# Patient Record
Sex: FEMALE | Race: WHITE | Employment: FULL TIME | URBAN - METROPOLITAN AREA
[De-identification: names, ages, dates, MRNs, and addresses within clinical notes are randomized per-mention and may not be internally consistent; named-entity substitution may affect disease eponyms.]

---

## 2018-10-08 ENCOUNTER — ROUTINE PRENATAL (OUTPATIENT)
Dept: PERINATAL CARE | Age: 28
End: 2018-10-08
Payer: COMMERCIAL

## 2018-10-08 VITALS
HEIGHT: 64 IN | HEART RATE: 98 BPM | SYSTOLIC BLOOD PRESSURE: 134 MMHG | BODY MASS INDEX: 50.02 KG/M2 | DIASTOLIC BLOOD PRESSURE: 79 MMHG | RESPIRATION RATE: 18 BRPM | WEIGHT: 293 LBS | TEMPERATURE: 98.6 F

## 2018-10-08 DIAGNOSIS — O99.212 OBESITY AFFECTING PREGNANCY IN SECOND TRIMESTER: Primary | ICD-10-CM

## 2018-10-08 DIAGNOSIS — Z36.86 ENCOUNTER FOR SCREENING FOR RISK OF PRE-TERM LABOR: ICD-10-CM

## 2018-10-08 DIAGNOSIS — Z3A.20 20 WEEKS GESTATION OF PREGNANCY: ICD-10-CM

## 2018-10-08 LAB
ABDOMINAL CIRCUMFERENCE: NORMAL CM
BIPARIETAL DIAMETER: NORMAL CM
ESTIMATED FETAL WEIGHT: NORMAL GRAMS
FEMORAL DIAMETER: NORMAL CM
HC/AC: NORMAL
HEAD CIRCUMFERENCE: NORMAL CM

## 2018-10-08 PROCEDURE — 76817 TRANSVAGINAL US OBSTETRIC: CPT | Performed by: OBSTETRICS & GYNECOLOGY

## 2018-10-08 PROCEDURE — 76811 OB US DETAILED SNGL FETUS: CPT | Performed by: OBSTETRICS & GYNECOLOGY

## 2018-10-08 RX ORDER — PROMETHAZINE HYDROCHLORIDE 25 MG/1
25 TABLET ORAL DAILY
COMMUNITY
End: 2022-04-19

## 2018-10-08 NOTE — PROGRESS NOTES
Pt did not give a urine specimen at the time of vitals. Please refer to attached ultrasound report for doctor's evaluation of the clinical information obtained by vital signs, ultrasound, and/or non-stress test along with management recommendation.

## 2018-12-03 ENCOUNTER — ROUTINE PRENATAL (OUTPATIENT)
Dept: PERINATAL CARE | Age: 28
End: 2018-12-03
Payer: COMMERCIAL

## 2018-12-03 VITALS
SYSTOLIC BLOOD PRESSURE: 112 MMHG | BODY MASS INDEX: 50.02 KG/M2 | TEMPERATURE: 98.2 F | HEART RATE: 98 BPM | DIASTOLIC BLOOD PRESSURE: 76 MMHG | RESPIRATION RATE: 16 BRPM | WEIGHT: 293 LBS | HEIGHT: 64 IN

## 2018-12-03 DIAGNOSIS — O99.213 OBESITY AFFECTING PREGNANCY IN THIRD TRIMESTER: Primary | ICD-10-CM

## 2018-12-03 DIAGNOSIS — Z3A.28 28 WEEKS GESTATION OF PREGNANCY: ICD-10-CM

## 2018-12-03 DIAGNOSIS — Z13.89 ENCOUNTER FOR ROUTINE SCREENING FOR MALFORMATION USING ULTRASONICS: ICD-10-CM

## 2018-12-03 PROCEDURE — 76805 OB US >/= 14 WKS SNGL FETUS: CPT | Performed by: OBSTETRICS & GYNECOLOGY

## 2018-12-03 PROCEDURE — 76819 FETAL BIOPHYS PROFIL W/O NST: CPT | Performed by: OBSTETRICS & GYNECOLOGY

## 2021-12-14 ENCOUNTER — HOSPITAL ENCOUNTER (EMERGENCY)
Age: 31
Discharge: HOME | End: 2021-12-14
Payer: COMMERCIAL

## 2021-12-14 VITALS
TEMPERATURE: 98 F | BODY MASS INDEX: 51.44 KG/M2 | HEART RATE: 88 BPM | WEIGHT: 293 LBS | OXYGEN SATURATION: 97 % | SYSTOLIC BLOOD PRESSURE: 146 MMHG | RESPIRATION RATE: 16 BRPM | DIASTOLIC BLOOD PRESSURE: 69 MMHG

## 2021-12-14 DIAGNOSIS — J06.9 VIRAL URI WITH COUGH: Primary | ICD-10-CM

## 2021-12-14 DIAGNOSIS — Z20.822 COVID-19 RULED OUT BY LABORATORY TESTING: ICD-10-CM

## 2021-12-14 LAB — SARS-COV-2, NAA: NOT  DETECTED

## 2021-12-14 PROCEDURE — 99202 OFFICE O/P NEW SF 15 MIN: CPT | Performed by: NURSE PRACTITIONER

## 2021-12-14 PROCEDURE — 99203 OFFICE O/P NEW LOW 30 MIN: CPT

## 2021-12-14 PROCEDURE — 87635 SARS-COV-2 COVID-19 AMP PRB: CPT

## 2021-12-14 RX ORDER — ACETAMINOPHEN 325 MG/1
650 TABLET ORAL EVERY 6 HOURS PRN
COMMUNITY

## 2021-12-14 ASSESSMENT — ENCOUNTER SYMPTOMS
WHEEZING: 0
TROUBLE SWALLOWING: 0
DIARRHEA: 0
SORE THROAT: 0
CHEST TIGHTNESS: 0
COUGH: 1
RHINORRHEA: 1
FACIAL SWELLING: 0
EYE REDNESS: 0
VOMITING: 0
NAUSEA: 0
EYE DISCHARGE: 0
SHORTNESS OF BREATH: 0

## 2021-12-14 NOTE — ED PROVIDER NOTES
40 [address]       Chief Complaint   Patient presents with    Cough    Headache    Otalgia    Concern For COVID-19       Nurses Notes reviewed and I agree except as noted in the HPI. HISTORY OF PRESENT ILLNESS   Marnie Baldwin is a 32 y.o. female who presents for evaluation of cough, headache, ear pain. Onset of symptoms this past Thursday/Friday, unchanged. Ear pain/headache, resolved. Cough is intermittent, dry. No fever, wheeze, shortness of breath. Patient currently in her first trimester. Denies chest pain or shortness of breath. No improvement with current treatment. REVIEW OF SYSTEMS     Review of Systems   Constitutional: Negative for chills, diaphoresis, fatigue and fever. HENT: Positive for congestion, ear pain and rhinorrhea. Negative for ear discharge, facial swelling, sore throat and trouble swallowing. Eyes: Negative for discharge and redness. Respiratory: Positive for cough. Negative for chest tightness, shortness of breath and wheezing. Cardiovascular: Negative for chest pain. Gastrointestinal: Negative for diarrhea, nausea and vomiting. Genitourinary: Negative for decreased urine volume. Musculoskeletal: Positive for myalgias. Negative for neck pain and neck stiffness. Skin: Negative for rash. Neurological: Positive for headaches. Hematological: Negative for adenopathy. Psychiatric/Behavioral: Negative for sleep disturbance. PAST MEDICAL HISTORY   History reviewed. No pertinent past medical history. SURGICAL HISTORY     Patient  has a past surgical history that includes Cholecystectomy; [place] tooth extraction; and Tonsillectomy.     CURRENT MEDICATIONS       Previous Medications    ACETAMINOPHEN (TYLENOL) 325 MG TABLET    Take 650 mg by mouth every 6 hours as needed for Pain    DOXYLAMINE SUCCINATE, SLEEP, (UNISOM PO)    Take by mouth nightly    PRENATAL VIT-FE FUMARATE-FA (PRENATAL PO) heard.  No friction rub. No gallop. Pulmonary:      Effort: Pulmonary effort is normal. No respiratory distress. Breath sounds: Normal breath sounds. Chest:   Breasts:      Right: No supraclavicular adenopathy. Left: No supraclavicular adenopathy. Musculoskeletal:      Cervical back: Normal range of motion and neck supple. Lymphadenopathy:      Head:      Right side of head: No submental, submandibular, tonsillar or occipital adenopathy. Left side of head: No submental, submandibular, tonsillar or occipital adenopathy. Cervical: No cervical adenopathy. Upper Body:      Right upper body: No supraclavicular adenopathy. Left upper body: No supraclavicular adenopathy. Skin:     General: Skin is warm and dry. Capillary Refill: Capillary refill takes less than 2 seconds. Coloration: Skin is not pale. Findings: No rash. Comments: Skin warm and dry to touch, no rashes noted on exposed surfaces. Neurological:      Mental Status: She is alert and oriented to person, place, and time. She is not disoriented. Psychiatric:         Mood and Affect: Mood normal.         Behavior: Behavior is cooperative. DIAGNOSTIC RESULTS   Labs:   Results for orders placed or performed during the hospital encounter of 12/14/21   COVID-19, Rapid   Result Value Ref Range    SARS-CoV-2, VANDA NOT  DETECTED NOT DETECTED       IMAGING:  No orders to display     URGENT CARE COURSE:     Vitals:    12/14/21 1358   BP: (!) 146/69   Pulse: 88   Resp: 16   Temp: 98 °F (36.7 °C)   TempSrc: Temporal   SpO2: 97%   Weight: 295 lb (133.8 kg)       Medications - No data to display  PROCEDURES:  None  FINALIMPRESSION      1. Viral URI with cough    2. COVID-19 ruled out by laboratory testing        DISPOSITION/PLAN   DISPOSITION Decision To Discharge 12/14/2021 02:26:53 PM  Nontoxic, no distress. Cough secondary to postnasal drainage. COVID-19 negative.   Exam consistent with viral URI

## 2021-12-14 NOTE — Clinical Note
Siobhan Guadalupe was seen and treated in our emergency department on 12/14/2021. She may return to work on 12/16/2021. If you have any questions or concerns, please don't hesitate to call.       Audie Arreguin, EDIS - CNP

## 2021-12-14 NOTE — ED NOTES
Nasal swab for covid obtained. Labeled, taken to lab, tolerated well. Nurse wearing PPE.      Zainab Costello, WHITLEY  70/13/44 [id number]

## 2021-12-14 NOTE — ED TRIAGE NOTES
Patient states has concerns for covid, exposed at work last wk. Symptoms started Friday with sinus pressure, dry cough, minor earache, headaches, tylenol taken. Patient states she is 10 wk. Pregnant, due date 7-9-2022.

## 2021-12-14 NOTE — Clinical Note
Taty Michel was seen and treated in our emergency department on 12/14/2021. She may return to work on 12/15/2021. If you have any questions or concerns, please don't hesitate to call.       Javier Carlson, APRN - CNP

## 2022-04-19 ENCOUNTER — HOSPITAL ENCOUNTER (OUTPATIENT)
Dept: NURSING | Age: 32
Discharge: HOME | End: 2022-04-19
Payer: COMMERCIAL

## 2022-04-19 VITALS
SYSTOLIC BLOOD PRESSURE: 120 MMHG | OXYGEN SATURATION: 98 % | DIASTOLIC BLOOD PRESSURE: 55 MMHG | HEART RATE: 86 BPM | RESPIRATION RATE: 16 BRPM

## 2022-04-19 LAB
ANTIBODY SCREEN: NORMAL
GESTATIONAL AGE(WEEKS): NORMAL

## 2022-04-19 PROCEDURE — 6360000002 HC RX W HCPCS: Performed by: OBSTETRICS & GYNECOLOGY

## 2022-04-19 PROCEDURE — 86850 RBC ANTIBODY SCREEN: CPT

## 2022-04-19 PROCEDURE — 96372 THER/PROPH/DIAG INJ SC/IM: CPT

## 2022-04-19 RX ORDER — FERROUS SULFATE 325(65) MG
325 TABLET ORAL
COMMUNITY

## 2022-04-19 RX ADMIN — HUMAN RHO(D) IMMUNE GLOBULIN 300 MCG: 300 INJECTION, SOLUTION INTRAMUSCULAR at 16:26

## 2022-04-19 NOTE — PROGRESS NOTES
1507: Patient arrived ambulatory for rhogam injection. Patient rights and responsibilities offered to patient. 1520:Blood work collected per order. Patient feeling dizzy and queasy during draw, provided snack and beverage. 1545:Patient states she is feeling much better now. Awaiting rhogam injection. 1630: Injection administered. AVS reviewed with patient, voiced understanding. Patient discharged ambulatory.                              _m___ Safety:       (Environmental)   [place] to environment   Ensure ID band is correct and in place/ allergy band as needed   Assess for fall risk   Initiate fall precautions as applicable (fall band, side rails, etc.)   Call light within reach   Bed in low position/ wheels locked    _m___ Pain:        Assess pain level and characteristics   Administer analgesics as ordered   Assess effectiveness of pain management and report to MD as needed    _m___ Knowledge Deficit:   Assess baseline knowledge   Provide teaching at level of understanding   Provide teaching via preferred learning method   Evaluate teaching effectiveness    _m___ Hemodynamic/Respiratory Status:       (Pre and Post Procedure Monitoring)   Assess/Monitor vital signs and LOC   Assess Baseline SpO2 prior to any sedation   Obtain weight/height   Assess vital signs/ LOC until patient meets discharge criteria   Monitor procedure site and notify MD of any issues

## 2022-06-28 ENCOUNTER — HOSPITAL ENCOUNTER (INPATIENT)
Age: 32
LOS: 1 days | Discharge: HOME | End: 2022-06-29
Attending: OBSTETRICS & GYNECOLOGY | Admitting: OBSTETRICS & GYNECOLOGY
Payer: COMMERCIAL

## 2022-06-28 ENCOUNTER — ANESTHESIA (OUTPATIENT)
Dept: LABOR AND DELIVERY | Age: 32
End: 2022-06-28
Payer: COMMERCIAL

## 2022-06-28 ENCOUNTER — ANESTHESIA EVENT (OUTPATIENT)
Dept: LABOR AND DELIVERY | Age: 32
End: 2022-06-28
Payer: COMMERCIAL

## 2022-06-28 PROBLEM — O42.90 ROM (RUPTURE OF MEMBRANES), PREMATURE: Status: ACTIVE | Noted: 2022-06-28

## 2022-06-28 PROBLEM — O47.9 FALSE LABOR: Status: ACTIVE | Noted: 2022-06-28

## 2022-06-28 LAB
ABO: NORMAL
AMNISURE PATIENT RESULT: NORMAL
AMPHETAMINE+METHAMPHETAMINE URINE SCREEN: NEGATIVE
BARBITURATE QUANTITATIVE URINE: NEGATIVE
BASOPHILS # BLD: 0.2 %
BASOPHILS ABSOLUTE: 0 THOU/MM3 (ref 0–0.1)
BENZODIAZEPINE QUANTITATIVE URINE: NEGATIVE
CANNABINOID QUANTITATIVE URINE: NEGATIVE
COCAINE METABOLITE QUANTITATIVE URINE: NEGATIVE
EOSINOPHIL # BLD: 0.5 %
EOSINOPHILS ABSOLUTE: 0.1 THOU/MM3 (ref 0–0.4)
ERYTHROCYTE [DISTWIDTH] IN BLOOD BY AUTOMATED COUNT: 16.3 % (ref 11.5–14.5)
ERYTHROCYTE [DISTWIDTH] IN BLOOD BY AUTOMATED COUNT: 46.2 FL (ref 35–45)
HCT VFR BLD CALC: 36.2 % (ref 37–47)
HEMOGLOBIN: 11.3 GM/DL (ref 12–16)
IMMATURE GRANS (ABS): 0.03 THOU/MM3 (ref 0–0.07)
IMMATURE GRANULOCYTES: 0.3 %
INDIRECT COOMBS: NORMAL
LYMPHOCYTES # BLD: 25.3 %
LYMPHOCYTES ABSOLUTE: 2.9 THOU/MM3 (ref 1–4.8)
MCH RBC QN AUTO: 24.7 PG (ref 26–33)
MCHC RBC AUTO-ENTMCNC: 31.2 GM/DL (ref 32.2–35.5)
MCV RBC AUTO: 79.2 FL (ref 81–99)
MONOCYTES # BLD: 7.4 %
MONOCYTES ABSOLUTE: 0.8 THOU/MM3 (ref 0.4–1.3)
NUCLEATED RED BLOOD CELLS: 0 /100 WBC
OPIATES, URINE: NEGATIVE
OXYCODONE: NEGATIVE
PHENCYCLIDINE QUANTITATIVE URINE: NEGATIVE
PLATELET # BLD: 264 THOU/MM3 (ref 130–400)
PMV BLD AUTO: 10.4 FL (ref 9.4–12.4)
RBC # BLD: 4.57 MILL/MM3 (ref 4.2–5.4)
RH FACTOR: NORMAL
RPR: NONREACTIVE
SEG NEUTROPHILS: 66.3 %
SEGMENTED NEUTROPHILS ABSOLUTE COUNT: 7.6 THOU/MM3 (ref 1.8–7.7)
WBC # BLD: 11.4 THOU/MM3 (ref 4.8–10.8)

## 2022-06-28 PROCEDURE — 86592 SYPHILIS TEST NON-TREP QUAL: CPT

## 2022-06-28 PROCEDURE — 2580000003 HC RX 258: Performed by: OBSTETRICS & GYNECOLOGY

## 2022-06-28 PROCEDURE — 0KQM0ZZ REPAIR PERINEUM MUSCLE, OPEN APPROACH: ICD-10-PCS | Performed by: OBSTETRICS & GYNECOLOGY

## 2022-06-28 PROCEDURE — 7200000001 HC VAGINAL DELIVERY

## 2022-06-28 PROCEDURE — 6370000000 HC RX 637 (ALT 250 FOR IP): Performed by: OBSTETRICS & GYNECOLOGY

## 2022-06-28 PROCEDURE — 6360000002 HC RX W HCPCS: Performed by: OBSTETRICS & GYNECOLOGY

## 2022-06-28 PROCEDURE — 3700000025 EPIDURAL BLOCK: Performed by: ANESTHESIOLOGY

## 2022-06-28 PROCEDURE — 86900 BLOOD TYPING SEROLOGIC ABO: CPT

## 2022-06-28 PROCEDURE — 84112 EVAL AMNIOTIC FLUID PROTEIN: CPT

## 2022-06-28 PROCEDURE — 90472 IMMUNIZATION ADMIN EACH ADD: CPT

## 2022-06-28 PROCEDURE — 2500000003 HC RX 250 WO HCPCS

## 2022-06-28 PROCEDURE — 90471 IMMUNIZATION ADMIN: CPT | Performed by: OBSTETRICS & GYNECOLOGY

## 2022-06-28 PROCEDURE — 90715 TDAP VACCINE 7 YRS/> IM: CPT | Performed by: OBSTETRICS & GYNECOLOGY

## 2022-06-28 PROCEDURE — 80307 DRUG TEST PRSMV CHEM ANLYZR: CPT

## 2022-06-28 PROCEDURE — 2500000003 HC RX 250 WO HCPCS: Performed by: NURSE ANESTHETIST, CERTIFIED REGISTERED

## 2022-06-28 PROCEDURE — 90707 MMR VACCINE SC: CPT

## 2022-06-28 PROCEDURE — 85025 COMPLETE CBC W/AUTO DIFF WBC: CPT

## 2022-06-28 PROCEDURE — 6360000002 HC RX W HCPCS

## 2022-06-28 PROCEDURE — 86901 BLOOD TYPING SEROLOGIC RH(D): CPT

## 2022-06-28 PROCEDURE — 86885 COOMBS TEST INDIRECT QUAL: CPT

## 2022-06-28 PROCEDURE — 51701 INSERT BLADDER CATHETER: CPT

## 2022-06-28 PROCEDURE — 1220000000 HC SEMI PRIVATE OB R&B

## 2022-06-28 RX ORDER — SODIUM CHLORIDE, SODIUM LACTATE, POTASSIUM CHLORIDE, CALCIUM CHLORIDE 600; 310; 30; 20 MG/100ML; MG/100ML; MG/100ML; MG/100ML
INJECTION, SOLUTION INTRAVENOUS CONTINUOUS
Status: DISCONTINUED | OUTPATIENT
Start: 2022-06-28 | End: 2022-06-29 | Stop reason: HOSPADM

## 2022-06-28 RX ORDER — SODIUM CHLORIDE 0.9 % (FLUSH) 0.9 %
5-40 SYRINGE (ML) INJECTION PRN
Status: DISCONTINUED | OUTPATIENT
Start: 2022-06-28 | End: 2022-06-28 | Stop reason: HOSPADM

## 2022-06-28 RX ORDER — MISOPROSTOL 200 UG/1
1000 TABLET ORAL PRN
Status: DISCONTINUED | OUTPATIENT
Start: 2022-06-28 | End: 2022-06-28 | Stop reason: HOSPADM

## 2022-06-28 RX ORDER — MISOPROSTOL 200 UG/1
800 TABLET ORAL PRN
Status: DISCONTINUED | OUTPATIENT
Start: 2022-06-28 | End: 2022-06-29 | Stop reason: HOSPADM

## 2022-06-28 RX ORDER — DIPHENHYDRAMINE HYDROCHLORIDE 50 MG/ML
25 INJECTION INTRAMUSCULAR; INTRAVENOUS EVERY 4 HOURS PRN
Status: DISCONTINUED | OUTPATIENT
Start: 2022-06-28 | End: 2022-06-28 | Stop reason: HOSPADM

## 2022-06-28 RX ORDER — ONDANSETRON 4 MG/1
8 TABLET, ORALLY DISINTEGRATING ORAL EVERY 8 HOURS PRN
Status: DISCONTINUED | OUTPATIENT
Start: 2022-06-28 | End: 2022-06-29 | Stop reason: HOSPADM

## 2022-06-28 RX ORDER — DOCUSATE SODIUM 100 MG/1
100 CAPSULE, LIQUID FILLED ORAL 2 TIMES DAILY
Status: DISCONTINUED | OUTPATIENT
Start: 2022-06-28 | End: 2022-06-29 | Stop reason: HOSPADM

## 2022-06-28 RX ORDER — MORPHINE SULFATE 4 MG/ML
4 INJECTION, SOLUTION INTRAMUSCULAR; INTRAVENOUS
Status: DISCONTINUED | OUTPATIENT
Start: 2022-06-28 | End: 2022-06-28 | Stop reason: HOSPADM

## 2022-06-28 RX ORDER — MORPHINE SULFATE 2 MG/ML
2 INJECTION, SOLUTION INTRAMUSCULAR; INTRAVENOUS
Status: DISCONTINUED | OUTPATIENT
Start: 2022-06-28 | End: 2022-06-28 | Stop reason: HOSPADM

## 2022-06-28 RX ORDER — LIDOCAINE HYDROCHLORIDE 10 MG/ML
30 INJECTION, SOLUTION EPIDURAL; INFILTRATION; INTRACAUDAL; PERINEURAL PRN
Status: DISCONTINUED | OUTPATIENT
Start: 2022-06-28 | End: 2022-06-28 | Stop reason: HOSPADM

## 2022-06-28 RX ORDER — SODIUM CHLORIDE 0.9 % (FLUSH) 0.9 %
5-40 SYRINGE (ML) INJECTION PRN
Status: DISCONTINUED | OUTPATIENT
Start: 2022-06-28 | End: 2022-06-28

## 2022-06-28 RX ORDER — IBUPROFEN 800 MG/1
800 TABLET ORAL EVERY 8 HOURS PRN
Status: DISCONTINUED | OUTPATIENT
Start: 2022-06-28 | End: 2022-06-28 | Stop reason: HOSPADM

## 2022-06-28 RX ORDER — ACETAMINOPHEN 500 MG
1000 TABLET ORAL EVERY 8 HOURS PRN
Status: DISCONTINUED | OUTPATIENT
Start: 2022-06-28 | End: 2022-06-29 | Stop reason: HOSPADM

## 2022-06-28 RX ORDER — NALBUPHINE HCL 10 MG/ML
5 AMPUL (ML) INJECTION EVERY 4 HOURS PRN
Status: DISCONTINUED | OUTPATIENT
Start: 2022-06-28 | End: 2022-06-28 | Stop reason: HOSPADM

## 2022-06-28 RX ORDER — SODIUM CHLORIDE, SODIUM LACTATE, POTASSIUM CHLORIDE, CALCIUM CHLORIDE 600; 310; 30; 20 MG/100ML; MG/100ML; MG/100ML; MG/100ML
INJECTION, SOLUTION INTRAVENOUS CONTINUOUS
Status: DISCONTINUED | OUTPATIENT
Start: 2022-06-28 | End: 2022-06-28

## 2022-06-28 RX ORDER — NALOXONE HYDROCHLORIDE 0.4 MG/ML
INJECTION, SOLUTION INTRAMUSCULAR; INTRAVENOUS; SUBCUTANEOUS PRN
Status: DISCONTINUED | OUTPATIENT
Start: 2022-06-28 | End: 2022-06-28 | Stop reason: HOSPADM

## 2022-06-28 RX ORDER — EPHEDRINE SULFATE/0.9% NACL/PF 50 MG/5 ML
10 SYRINGE (ML) INTRAVENOUS ONCE
Status: DISCONTINUED | OUTPATIENT
Start: 2022-06-28 | End: 2022-06-28

## 2022-06-28 RX ORDER — TERBUTALINE SULFATE 1 MG/ML
0.25 INJECTION, SOLUTION SUBCUTANEOUS
Status: DISCONTINUED | OUTPATIENT
Start: 2022-06-28 | End: 2022-06-28 | Stop reason: HOSPADM

## 2022-06-28 RX ORDER — METHYLERGONOVINE MALEATE 0.2 MG/ML
200 INJECTION INTRAVENOUS PRN
Status: DISCONTINUED | OUTPATIENT
Start: 2022-06-28 | End: 2022-06-28 | Stop reason: HOSPADM

## 2022-06-28 RX ORDER — SODIUM CHLORIDE 0.9 % (FLUSH) 0.9 %
5-40 SYRINGE (ML) INJECTION EVERY 12 HOURS SCHEDULED
Status: DISCONTINUED | OUTPATIENT
Start: 2022-06-28 | End: 2022-06-28

## 2022-06-28 RX ORDER — MODIFIED LANOLIN
OINTMENT (GRAM) TOPICAL PRN
Status: DISCONTINUED | OUTPATIENT
Start: 2022-06-28 | End: 2022-06-29 | Stop reason: HOSPADM

## 2022-06-28 RX ORDER — SODIUM CHLORIDE, SODIUM LACTATE, POTASSIUM CHLORIDE, AND CALCIUM CHLORIDE .6; .31; .03; .02 G/100ML; G/100ML; G/100ML; G/100ML
500 INJECTION, SOLUTION INTRAVENOUS PRN
Status: DISCONTINUED | OUTPATIENT
Start: 2022-06-28 | End: 2022-06-28 | Stop reason: HOSPADM

## 2022-06-28 RX ORDER — SODIUM CHLORIDE 9 MG/ML
25 INJECTION, SOLUTION INTRAVENOUS PRN
Status: DISCONTINUED | OUTPATIENT
Start: 2022-06-28 | End: 2022-06-28 | Stop reason: HOSPADM

## 2022-06-28 RX ORDER — EPHEDRINE SULFATE/0.9% NACL/PF 50 MG/5 ML
SYRINGE (ML) INTRAVENOUS
Status: COMPLETED
Start: 2022-06-28 | End: 2022-06-28

## 2022-06-28 RX ORDER — SODIUM CHLORIDE, SODIUM LACTATE, POTASSIUM CHLORIDE, AND CALCIUM CHLORIDE .6; .31; .03; .02 G/100ML; G/100ML; G/100ML; G/100ML
1000 INJECTION, SOLUTION INTRAVENOUS ONCE
Status: COMPLETED | OUTPATIENT
Start: 2022-06-28 | End: 2022-06-28

## 2022-06-28 RX ORDER — SODIUM CHLORIDE 0.9 % (FLUSH) 0.9 %
5-40 SYRINGE (ML) INJECTION EVERY 12 HOURS SCHEDULED
Status: DISCONTINUED | OUTPATIENT
Start: 2022-06-28 | End: 2022-06-28 | Stop reason: HOSPADM

## 2022-06-28 RX ORDER — METHYLERGONOVINE MALEATE 0.2 MG/ML
200 INJECTION INTRAVENOUS PRN
Status: DISCONTINUED | OUTPATIENT
Start: 2022-06-28 | End: 2022-06-29 | Stop reason: HOSPADM

## 2022-06-28 RX ORDER — IBUPROFEN 800 MG/1
800 TABLET ORAL EVERY 8 HOURS
Status: DISCONTINUED | OUTPATIENT
Start: 2022-06-28 | End: 2022-06-29 | Stop reason: HOSPADM

## 2022-06-28 RX ORDER — ONDANSETRON 2 MG/ML
8 INJECTION INTRAMUSCULAR; INTRAVENOUS EVERY 6 HOURS PRN
Status: DISCONTINUED | OUTPATIENT
Start: 2022-06-28 | End: 2022-06-28 | Stop reason: HOSPADM

## 2022-06-28 RX ORDER — ONDANSETRON 2 MG/ML
4 INJECTION INTRAMUSCULAR; INTRAVENOUS EVERY 6 HOURS PRN
Status: DISCONTINUED | OUTPATIENT
Start: 2022-06-28 | End: 2022-06-28 | Stop reason: HOSPADM

## 2022-06-28 RX ORDER — PENICILLIN G POTASSIUM 5000000 [IU]/1
INJECTION, POWDER, FOR SOLUTION INTRAMUSCULAR; INTRAVENOUS
Status: DISCONTINUED
Start: 2022-06-28 | End: 2022-06-28

## 2022-06-28 RX ORDER — PENICILLIN G 3000000 [IU]/50ML
3 INJECTION, SOLUTION INTRAVENOUS EVERY 4 HOURS
Status: DISCONTINUED | OUTPATIENT
Start: 2022-06-28 | End: 2022-06-28 | Stop reason: HOSPADM

## 2022-06-28 RX ORDER — SODIUM CHLORIDE 9 MG/ML
INJECTION, SOLUTION INTRAVENOUS PRN
Status: DISCONTINUED | OUTPATIENT
Start: 2022-06-28 | End: 2022-06-28

## 2022-06-28 RX ORDER — BUTORPHANOL TARTRATE 1 MG/ML
1 INJECTION, SOLUTION INTRAMUSCULAR; INTRAVENOUS
Status: DISCONTINUED | OUTPATIENT
Start: 2022-06-28 | End: 2022-06-28 | Stop reason: HOSPADM

## 2022-06-28 RX ORDER — SODIUM CHLORIDE, SODIUM LACTATE, POTASSIUM CHLORIDE, AND CALCIUM CHLORIDE .6; .31; .03; .02 G/100ML; G/100ML; G/100ML; G/100ML
1000 INJECTION, SOLUTION INTRAVENOUS PRN
Status: DISCONTINUED | OUTPATIENT
Start: 2022-06-28 | End: 2022-06-28 | Stop reason: HOSPADM

## 2022-06-28 RX ORDER — CARBOPROST TROMETHAMINE 250 UG/ML
250 INJECTION, SOLUTION INTRAMUSCULAR PRN
Status: DISCONTINUED | OUTPATIENT
Start: 2022-06-28 | End: 2022-06-28 | Stop reason: HOSPADM

## 2022-06-28 RX ORDER — ACETAMINOPHEN 325 MG/1
650 TABLET ORAL EVERY 4 HOURS PRN
Status: DISCONTINUED | OUTPATIENT
Start: 2022-06-28 | End: 2022-06-28 | Stop reason: HOSPADM

## 2022-06-28 RX ORDER — SEVOFLURANE 250 ML/250ML
1 LIQUID RESPIRATORY (INHALATION) CONTINUOUS PRN
Status: DISCONTINUED | OUTPATIENT
Start: 2022-06-28 | End: 2022-06-28 | Stop reason: HOSPADM

## 2022-06-28 RX ADMIN — SODIUM CHLORIDE, POTASSIUM CHLORIDE, SODIUM LACTATE AND CALCIUM CHLORIDE: 600; 310; 30; 20 INJECTION, SOLUTION INTRAVENOUS at 05:20

## 2022-06-28 RX ADMIN — SODIUM CHLORIDE, POTASSIUM CHLORIDE, SODIUM LACTATE AND CALCIUM CHLORIDE 1000 ML: 600; 310; 30; 20 INJECTION, SOLUTION INTRAVENOUS at 03:03

## 2022-06-28 RX ADMIN — MEASLES, MUMPS, AND RUBELLA VIRUS VACCINE LIVE 0.5 ML: 1000; 12500; 1000 INJECTION, POWDER, LYOPHILIZED, FOR SUSPENSION SUBCUTANEOUS at 11:39

## 2022-06-28 RX ADMIN — Medication 16 ML/HR: at 06:57

## 2022-06-28 RX ADMIN — PENICILLIN G 3 MILLION UNITS: 3000000 INJECTION, SOLUTION INTRAVENOUS at 07:42

## 2022-06-28 RX ADMIN — Medication 166.7 ML: at 08:06

## 2022-06-28 RX ADMIN — ACETAMINOPHEN 1000 MG: 500 TABLET ORAL at 23:44

## 2022-06-28 RX ADMIN — IBUPROFEN 800 MG: 800 TABLET, FILM COATED ORAL at 08:46

## 2022-06-28 RX ADMIN — TETANUS TOXOID, REDUCED DIPHTHERIA TOXOID AND ACELLULAR PERTUSSIS VACCINE, ADSORBED 0.5 ML: 5; 2.5; 8; 8; 2.5 SUSPENSION INTRAMUSCULAR at 11:28

## 2022-06-28 RX ADMIN — DOCUSATE SODIUM 100 MG: 100 CAPSULE, LIQUID FILLED ORAL at 19:48

## 2022-06-28 RX ADMIN — Medication 50 MG: at 07:05

## 2022-06-28 RX ADMIN — IBUPROFEN 800 MG: 800 TABLET, FILM COATED ORAL at 18:00

## 2022-06-28 RX ADMIN — Medication 87.3 MILLI-UNITS/MIN: at 08:17

## 2022-06-28 RX ADMIN — BUTORPHANOL TARTRATE 1 MG: 1 INJECTION, SOLUTION INTRAMUSCULAR; INTRAVENOUS at 05:16

## 2022-06-28 RX ADMIN — DEXTROSE MONOHYDRATE 5 MILLION UNITS: 5 INJECTION INTRAVENOUS at 03:57

## 2022-06-28 ASSESSMENT — PAIN DESCRIPTION - DESCRIPTORS: DESCRIPTORS: CRAMPING

## 2022-06-28 ASSESSMENT — PAIN DESCRIPTION - LOCATION: LOCATION: ABDOMEN

## 2022-06-28 ASSESSMENT — PAIN SCALES - GENERAL
PAINLEVEL_OUTOF10: 4
PAINLEVEL_OUTOF10: 7
PAINLEVEL_OUTOF10: 4
PAINLEVEL_OUTOF10: 6

## 2022-06-28 ASSESSMENT — PAIN DESCRIPTION - ORIENTATION: ORIENTATION: LOWER;MID

## 2022-06-28 NOTE — PLAN OF CARE
Problem: Vaginal Birth or  Section  Goal: Fetal and maternal status remain reassuring during the birth process  Description:  Birth OB-Pregnancy care plan goal which identifies if the fetal and maternal status remain reassuring during the birth process  2022 08 by Linda Davalos RN  Outcome: Progressing  2022 by Marium Lawton RN  Flowsheets (Taken 2022)  Fetal and Maternal Status Remain Reassuring During the Birth Process:   Monitor vital signs   Monitor uterine activity   Monitor fetal heart rate   Monitor labor progression (Vaginal delivery)     Problem: Pain  Goal: Verbalizes/displays adequate comfort level or baseline comfort level  2022 by Linda Davalos RN  Outcome: Progressing  2022 by Marium Lawton RN  Flowsheets (Taken 2022)  Verbalizes/displays adequate comfort level or baseline comfort level:   Encourage patient to monitor pain and request assistance   Assess pain using appropriate pain scale   Administer analgesics based on type and severity of pain and evaluate response   Implement non-pharmacological measures as appropriate and evaluate response     Problem: Infection - Adult  Goal: Absence of fever/infection during anticipated neutropenic period  2022 by Linda Davalos RN  Outcome: Progressing  2022 by Marium Lawton RN  Note: Patient afebrile at this time and BOW is intact       Problem: Safety - Adult  Goal: Free from fall injury  2022 by Linda Davalos RN  Outcome: Progressing  2022 by Marium Lwaton RN  Flowsheets (Taken 2022)  Free From Fall Injury:   Instruct family/caregiver on patient safety   Based on caregiver fall risk screen, instruct family/caregiver to ask for assistance with transferring infant if caregiver noted to have fall risk factors     Problem: Discharge Planning  Goal: Discharge to home or other facility with appropriate resources  6/28/2022 0856 by Soila Hogan RN  Outcome: Progressing  6/28/2022 0227 by Juanis Jain RN  Flowsheets (Taken 6/28/2022 0106)  Discharge to home or other facility with appropriate resources:   Identify barriers to discharge with patient and caregiver   Identify discharge learning needs (meds, wound care, etc)   Care plan reviewed with patient and her , Cyndi Foster. Patient and Cyndihandy Mcneilson verbalize understanding of the plan of care and contribute to goal setting.

## 2022-06-28 NOTE — FLOWSHEET NOTE
Pt up to bathroom and voided a large amount. Kath care and teaching given, scant amount of lochia rubra noted. Back to bed, gait steady. Fundus is even with umbilicus, firm and center. Ice and tucks applied.

## 2022-06-28 NOTE — FLOWSHEET NOTE
Spoke with Dr. Pacheco Worley on the phone and updated on SROM with clear fluid at 0333. Labor orders recvied.

## 2022-06-28 NOTE — FLOWSHEET NOTE
Pt arrived to room 5a 13 per wc with infant in arms, oriented to room and plan of care discussed. Pt denies pain or needs, voiced to call for voids x2. Call light is within reach.

## 2022-06-28 NOTE — FLOWSHEET NOTE
Spoke with Dr. Rachel Sheridan on the phone and updated on patient here for contractions that started around 0000, she is a , 38w3d, GBS positive, denies leaking of fluids, was in the office yesterday and was 3cm, 50%, VE is the same at this time, contractions are 4-8 minutes apart, FHTs are reassuring at this time. Orders recvied.

## 2022-06-28 NOTE — FLOWSHEET NOTE
Pt up to bathroom and voided a large amount. Kath care per pt scant amount of lochia rubra noted. Back to bed, gait steady.

## 2022-06-28 NOTE — PLAN OF CARE
Problem: Postpartum  Goal: Experiences normal postpartum course  Description:  Postpartum OB-Pregnancy care plan goal which identifies if the mother is experiencing a normal postpartum course  Outcome: Progressing  Flowsheets (Taken 2022 1046)  Experiences Normal Postpartum Course:   Monitor maternal vital signs   Assess uterine involution  Note: Vs wnl     Problem: Postpartum  Goal: Appropriate maternal -  bonding  Description:  Postpartum OB-Pregnancy care plan goal which identifies if the mother and  are bonding appropriately  Outcome: Progressing  Note: Mom and infant bonding well     Problem: Postpartum  Goal: Establishment of infant feeding pattern  Description:  Postpartum OB-Pregnancy care plan goal which identifies if the mother is establishing a feeding pattern with their   Outcome: Progressing  Flowsheets (Taken 2022 1046)  Establishment of Infant Feeding Pattern:   Assess breast/bottle feeding   Refer to lactation as needed  Note: Mom latching infant well     Problem: Postpartum  Goal: Incisions, wounds, or drain sites healing without S/S of infection  Outcome: Progressing  Flowsheets  Taken 2022 1046  Incisions, Wounds, or Drain Sites Healing Without Sign and Symptoms of Infection: ADMISSION and DAILY: Assess and document risk factors for pressure ulcer development  Taken 2022 1020  Incisions, Wounds, or Drain Sites Healing Without Sign and Symptoms of Infection:   ADMISSION and DAILY: Assess and document risk factors for pressure ulcer development   TWICE DAILY: Assess and document skin integrity  Note: No foul smelling drainage noted     Problem: Pain  Goal: Verbalizes/displays adequate comfort level or baseline comfort level  Outcome: Progressing  Flowsheets  Taken 2022 1046 by Rose Mary Meza RN  Verbalizes/displays adequate comfort level or baseline comfort level:   Encourage patient to monitor pain and request assistance   Assess pain using appropriate pain scale  Taken 6/28/2022 1020 by Raquel Caballero RN  Verbalizes/displays adequate comfort level or baseline comfort level:   Encourage patient to monitor pain and request assistance   Assess pain using appropriate pain scale  Taken 6/28/2022 0227 by Samia Cee RN  Verbalizes/displays adequate comfort level or baseline comfort level:   Encourage patient to monitor pain and request assistance   Assess pain using appropriate pain scale   Administer analgesics based on type and severity of pain and evaluate response   Implement non-pharmacological measures as appropriate and evaluate response  Note: Goal is 4, pt taking motrin and using tucks to antonio     Problem: Infection - Adult  Goal: Absence of infection at discharge  Outcome: Progressing  Flowsheets (Taken 6/28/2022 1046)  Absence of infection at discharge:   Assess and monitor for signs and symptoms of infection   Monitor lab/diagnostic results  Note: No foul smelling drainage noted     Problem: Infection - Adult  Goal: Absence of infection during hospitalization  Outcome: Progressing  Flowsheets (Taken 6/28/2022 1046)  Absence of infection during hospitalization:   Assess and monitor for signs and symptoms of infection   Monitor lab/diagnostic results     Problem: Safety - Adult  Goal: Free from fall injury  Outcome: Progressing  Flowsheets  Taken 6/28/2022 1046 by Raquel Caballero RN  Free From Fall Injury:   Based on caregiver fall risk screen, instruct family/caregiver to ask for assistance with transferring infant if caregiver noted to have fall risk factors   Instruct family/caregiver on patient safety  Taken 6/28/2022 1020 by Raquel Caballero RN  Free From Fall Injury: Instruct family/caregiver on patient safety  Taken 6/28/2022 0227 by Samia Cee RN  Free From Fall Injury:   Instruct family/caregiver on patient safety   Based on caregiver fall risk screen, instruct family/caregiver to ask for assistance with transferring infant if caregiver noted to have fall risk factors  Note: No falls noted     Problem: Discharge Planning  Goal: Discharge to home or other facility with appropriate resources  Outcome: Progressing  Flowsheets  Taken 6/28/2022 1046 by Alvira Landau, RN  Discharge to home or other facility with appropriate resources:   Arrange for needed discharge resources and transportation as appropriate   Identify barriers to discharge with patient and caregiver  Taken 6/28/2022 1020 by Alvira Landau, RN  Discharge to home or other facility with appropriate resources:   Identify barriers to discharge with patient and caregiver   Arrange for needed discharge resources and transportation as appropriate  Taken 6/28/2022 0227 by Joey Rabago RN  Discharge to home or other facility with appropriate resources:   Identify barriers to discharge with patient and caregiver   Identify discharge learning needs (meds, wound care, etc)  Note: Plan of care discussed     Problem: Chronic Conditions and Co-morbidities  Goal: Patient's chronic conditions and co-morbidity symptoms are monitored and maintained or improved  Outcome: Completed  Flowsheets (Taken 6/28/2022 0227 by Joey Rabago RN)  Care Plan - Patient's Chronic Conditions and Co-Morbidity Symptoms are Monitored and Maintained or Improved: Monitor and assess patient's chronic conditions and comorbid symptoms for stability, deterioration, or improvement   Care plan reviewed with patient. Patient verbalize understanding of the plan of care and contribute to goal setting.

## 2022-06-28 NOTE — PLAN OF CARE
Problem: Vaginal Birth or  Section  Goal: Fetal and maternal status remain reassuring during the birth process  Description:  Birth OB-Pregnancy care plan goal which identifies if the fetal and maternal status remain reassuring during the birth process  Flowsheets (Taken 2022)  Fetal and Maternal Status Remain Reassuring During the Birth Process:   Monitor vital signs   Monitor uterine activity   Monitor fetal heart rate   Monitor labor progression (Vaginal delivery)     Problem: Pain  Goal: Verbalizes/displays adequate comfort level or baseline comfort level  Flowsheets (Taken 2022)  Verbalizes/displays adequate comfort level or baseline comfort level:   Encourage patient to monitor pain and request assistance   Assess pain using appropriate pain scale   Administer analgesics based on type and severity of pain and evaluate response   Implement non-pharmacological measures as appropriate and evaluate response     Problem: Infection - Adult  Goal: Absence of fever/infection during anticipated neutropenic period  Note: Patient afebrile at this time and BOW is intact       Problem: Safety - Adult  Goal: Free from fall injury  Flowsheets (Taken 2022)  Free From Fall Injury:   Instruct family/caregiver on patient safety   Based on caregiver fall risk screen, instruct family/caregiver to ask for assistance with transferring infant if caregiver noted to have fall risk factors     Problem: Discharge Planning  Goal: Discharge to home or other facility with appropriate resources  Flowsheets (Taken 2022)  Discharge to home or other facility with appropriate resources:   Identify barriers to discharge with patient and caregiver   Identify discharge learning needs (meds, wound care, etc)     Problem: Chronic Conditions and Co-morbidities  Goal: Patient's chronic conditions and co-morbidity symptoms are monitored and maintained or improved  Flowsheets (Taken 2022)  Care Plan - Patient's Chronic Conditions and Co-Morbidity Symptoms are Monitored and Maintained or Improved: Monitor and assess patient's chronic conditions and comorbid symptoms for stability, deterioration, or improvement     Care plan reviewed with patient and spouse. Patient and spouse verbalize understanding of the plan of care and contribute to goal setting.

## 2022-06-28 NOTE — FLOWSHEET NOTE
Patient arrived to unit with complaint of contractions that started about 2 hours ago, about 8 minutes apart at this time, states she went fast with her last baby, this is her 3rd pregnacy, she doesn't think her water is broke, and is feeling the baby move as usual. Patient shown to the RR for voiding and changing into gown.

## 2022-06-28 NOTE — FLOWSHEET NOTE
Dr Mike Pienda updated on pt's status. VE: 6-7 after epidural. Last delivery was pretty quick. Will update in 15mins.

## 2022-06-28 NOTE — L&D DELIVERY NOTE
Department of Obstetrics and Gynecology  Spontaneous Vaginal Delivery Note      Pt Name: Margoth Guillory  MRN: [id number] [place] #: [de-identified]  YOB: 1990  Procedure Performed By: Deonte Salamanca DO, D.O.        Pre-operative Diagnosis:  Term pregnancy, Spontaneous labor, Single fetus and Pregnancy complicated by: maternal obesity    Post-operative Diagnosis: Same, delivered. Procedure:  Spontaneous vaginal delivery or Repair second degree spontaneous laceration    Surgeon:  Nola Vega DO, D.O. Information for the patient's :  Madie Sandhu [[id number]]          Anesthesia:  epidural anesthesia    Estimated blood loss:  300ml    Specimen:  Placenta not sent to pathology     Complications:  none    Condition:  infant stable to general nursery and mother stable    Details of Procedure: The patient is a 28 y.o. female at 36w4d   OB History        3    Para   1    Term           1    AB        Living   3       SAB        IAB        Ectopic        Molar        Multiple   1    Live Births   3             who was admitted for active phase labor. She received the following interventions: none. The patient progressed well,did receive an epidural, became complete and started to push. Patient progressed well and the fetal head was delivered over an intact perineum, no nuchal cord was noted, and the rest of the infant delivered atraumatically. Infant was placed on mother abdomen. Nose and mouth suctioned with bulb suction. Cord was clamped and cut. The delivery of the placenta was spontaneous and noted intact. The perineum and vagina were explored and a second degree laceration was repaired in standard fashion with 3-0 vicryl. Sponge, instruments, and needle counts were correct. Needles were disposed of appropriately. Delivery Summary:  Labor & Delivery Summary  Dilation Complete Date: 22  Dilation Complete Time:     Nola Vega D.O.     Mother's Information    Labor Events     Labor?: No  Cervical Ripening:   Now         Eamon Luis [[id number]]    Labor Events     Labor?: No   Steroids?: None  Cervical Ripening Date/Time:     Antibiotics Received during Labor?: Yes  Rupture Date/Time: 22 03:33:00   Rupture Type: SROM  Fluid Color: Clear  Fluid Odor: None  Fluid Volume:  Moderate  Induction: None  Augmentation: None  Labor Complications: None     Anesthesia    Method: Epidural     Start Pushing    Labor onset date/time: 22 00:00:00 Now   Dilation complete date/time: 22 07:47:00 EDT Now   Start pushing date/time:    Decision date/time (emergent ):       Delivery ()    Delivery Date/Time:  22 07:52:00   Delivery Type: Vaginal, Spontaneous    Details:         Presentation    Presentation: Vertex  _: Occiput  _: Anterior     Shoulder Dystocia    Shoulder Dystocia Present?: No  Add Second Maneuver  Add Third Maneuver  Add Fourth Maneuver  Add Fifth Maneuver  Add Sixth Maneuver  Add Seventh Maneuver  Add Eighth Maneuver  Add Ninth Maneuver     Assisted Delivery Details    Forceps Attempted?: No  Vacuum Extractor Attempted?: No     Document Additional Attempt       Document Additional Attempt             Cord    Vessels: 3 Vessels  Complications: None  Cord Blood Disposition: Lab  Gases Sent?: No     Placenta    Date/Time: 2022 08:06:00  Removal: Spontaneous  Appearance: Intact  Disposition: Placenta Refrigerator     Lacerations    Episiotomy: None  Perineal Lacerations: 2nd  Other Lacerations: no non-perineal laceration  Number of Repair Packets: 2     Vaginal Counts    Initial Count Personnel: Nikunj Aparicio RN  Initial Count Verified By: 10 SPONGES,1 NEEDLE,16 INST.,1 VACUUM    Sponges Needles Instruments   Initial Counts Correct Correct Correct   Final Counts      If the count is incorrect due to Intentionally Retained Foreign Object (IRFO) add the IRFO LDA in Lines/Drains. Add LDA: Link to [facility]     Blood Loss  Mother: Terence Logan #[id number]   Start of Mother's Information    Delivery Blood Loss  22 0000 - 22 0817    None           End of Mother's Information  Mother: Terence Logan #[id number]          Delivery Providers    Delivering clinician: Mainor Edmond,      Provider Role    Ludwig Fair, RN Primary Nurse    Jared Talley, RN Primary  Nurse    Senia Fuller RCP Respiratory Therapist (Day)    EDIS Renner - CRNA Nurse Anesthetist          [place] Assessment    Living Status: Living     Apgar Scoring Key:    0 1 2    Skin Color: Blue or pale Acrocyanotic Completely pink    Heart Rate: Absent <100 bpm >100 bpm    Reflex Irritability: No response Grimace Cry or active withdrawal    Muscle Tone: Limp Some flexion Active motion    Respiratory Effort: Absent Weak cry; hypoventilation Good, crying                  Skin Color:   Heart Rate:   Reflex Irritability:   Muscle Tone:   Respiratory Effort:    Total:            1 Minute:    0    2    2    2    2    8        Apgar 1 total from OB History    5 Minute:    1    2    2    2    2    9        Apgar 5 total from OB History    10 Minute:              15 Minute:              20 Minute:                      Apgars Assigned ByAshley Bustos RN          Resuscitation    Method: Stimulation            Measurements           Title    Skin to Skin Initiation Date/Time: 22 07:55:00 EDT   Skin to Skin With: Mother   Skin to Skin End Date/Time:

## 2022-06-28 NOTE — FLOWSHEET NOTE
Moderate amount of mucus and fluid noted on chux pad when patient got up to to RR, pad changed and an amnisure collected to see is SROM occurred. Awaiting result.

## 2022-06-28 NOTE — ANESTHESIA PRE PROCEDURE
Department of Anesthesiology  Preprocedure Note       Name:  Valerie Terrell   Age:  28 y.o.  :  1990                                          MRN:  [id number]         Date:  2022      Surgeon: * No surgeons listed *    Procedure: * No procedures listed *    Medications prior to admission:   Prior to Admission medications    Medication Sig Start Date End Date Taking?  Authorizing Provider   ferrous sulfate (IRON 325) 325 (65 Fe) MG tablet Take 325 mg by mouth daily (with breakfast) Every other day    Historical Provider, MD   acetaminophen (TYLENOL) 325 MG tablet Take 650 mg by mouth every 6 hours as needed for Pain    Historical Provider, MD   Prenatal Vit-Fe Fumarate-FA (PRENATAL PO) Take by mouth daily    Historical Provider, MD   Doxylamine Succinate, Sleep, (UNISOM PO) Take by mouth nightly    Historical Provider, MD       Current medications:    Current Facility-Administered Medications   Medication Dose Route Frequency Provider Last Rate Last Admin    lactated ringers infusion   IntraVENous Continuous Osman Royalty, DO        oxytocin (PITOCIN) 30 units in 500 mL infusion  1 cookie-units/min IntraVENous Continuous Osman Royalty, DO        terbutaline (BRETHINE) injection 0.25 mg  0.25 mg SubCUTAneous Once PRN Osman Royalty, DO        lactated ringers infusion   IntraVENous Continuous Osman Royalty,  mL/hr at 22 0520 New Bag at 22 0520    lactated ringers bolus  500 mL IntraVENous PRN Osman Royalty, DO        Or    lactated ringers bolus  1,000 mL IntraVENous PRN Osman Royalty, DO        sodium chloride flush 0.9 % injection 5-40 mL  5-40 mL IntraVENous 2 times per day Osman Royalty, DO        sodium chloride flush 0.9 % injection 5-40 mL  5-40 mL IntraVENous PRN Osman Royalty, DO        0.9 % sodium chloride infusion  25 mL IntraVENous PRN Osman Royalty, DO        lidocaine PF 1 % injection 30 mL  30 mL Other PRN Osman Royalty, DO  butorphanol (STADOL) injection 1 mg  1 mg IntraVENous Q1H PRN Jimena Racquel, DO   1 mg at 06/28/22 0516    nitrous oxide 50% inhalation 1 each  1 each Inhalation Continuous PRN Jimena Racquel, DO        ondansetron [facility]) injection 8 mg  8 mg IntraVENous Q6H PRN Jimena Racquel, DO        diphenhydrAMINE (BENADRYL) injection 25 mg  25 mg IntraVENous Q4H PRN Jimena Racquel, DO        oxytocin (PITOCIN) 30 units in 500 mL infusion  87.3 cookie-units/min IntraVENous PRN Jimena Racquel, DO        And    oxytocin (PITOCIN) 10 unit bolus from the bag  10 Units IntraVENous PRN Jimena Racquel, DO        methylergonovine (METHERGINE) injection 200 mcg  200 mcg IntraMUSCular PRN Jimena Racquel, DO        carboprost (HEMABATE) injection 250 mcg  250 mcg IntraMUSCular PRN Jimena Racquel, DO        miSOPROStol (CYTOTEC) tablet 1,000 mcg  1,000 mcg Rectal PRN Jimena Racquel, DO        acetaminophen (TYLENOL) tablet 650 mg  650 mg Oral Q4H PRN Jimena Racquel, DO        ibuprofen (ADVIL;MOTRIN) tablet 800 mg  800 mg Oral Q8H PRN Jimena Racquel, DO        morphine (PF) injection 2 mg  2 mg IntraVENous Q2H PRN Jimena Racquel, DO        Or    morphine injection 4 mg  4 mg IntraVENous Q2H PRN Jimena Racquel, DO        witch hazel-glycerin (TUCKS) pad   Topical PRN Jimena Racquel, DO        benzocaine-menthol (DERMOPLAST) 20-0.5 % spray   Topical PRN Jimena Racquel, DO        penicillin G potassium IVPB 3 Million Units  3 Million Units IntraVENous Q4H Jimena Racquel, DO        penicillin G potassium [id number] units injection             dextrose 5 % infusion             ePHEDrine injection 10 mg  10 mg IntraVENous Once Wynonia List, APRN - CRNA        fentaNYL 750 mcg, ropivacaine 0.1% in sodium chloride 0.9% 265mL (OB) epidural  16 mL/hr Epidural Continuous Wynonia List, APRN - CRNA        naloxone 0.4 mg in 10 mL sodium chloride syringe   IntraVENous PRN Radha Cr EDIS Mason CRNA        nalbuphine (NUBAIN) injection 5 mg  5 mg IntraVENous Q4H PRN EDIS Galvan CRNA        ondansetron [facility] injection 4 mg  4 mg IntraVENous Q6H PRN EDIS Galvan CRNA         Facility-Administered Medications Ordered in Other Encounters   Medication Dose Route Frequency Provider Last Rate Last Admin    fentaNYL 750 mcg, ropivacaine 0.1% in sodium chloride 0.9% 265mL (OB) epidural   Epidural Continuous PRN EDIS Galvan CRNA 16 mL/hr at 06/28/22 0657 16 mL/hr at 06/28/22 0657       Allergies:     Allergies   Allergen Reactions    Bactrim [Sulfamethoxazole-Trimethoprim] Nausea Only       Problem List:    Patient Active Problem List   Diagnosis Code    Obesity affecting pregnancy in second trimester O99.212    Obesity affecting pregnancy in third trimester O99.213    False labor O47.9    ROM (rupture of membranes), premature O42.90       Past Medical History:        Diagnosis Date    Anemia     Anxiety and depression     Postpartum depression        Past Surgical History:        Procedure Laterality Date    CHOLECYSTECTOMY      TONSILLECTOMY      WISDOM TOOTH EXTRACTION         Social History:    Social History     Tobacco Use    Smoking status: Never Smoker    Smokeless tobacco: Never Used   Substance Use Topics    Alcohol use: Not Currently                                Counseling given: Not Answered      Vital Signs (Current):   Vitals:    06/28/22 0213 06/28/22 0214 06/28/22 0406   BP:  138/83    Pulse:  100    Resp:  20    Temp:  36.5 °C (97.7 °F) 36 °C (96.8 °F)   TempSrc:  Temporal Temporal   SpO2:  96%    Weight: (!) 325 lb (147.4 kg)     Height: 5' 3\" (1.6 m)                                                BP Readings from Last 3 Encounters:   06/28/22 138/83   04/19/22 (!) 120/55   12/14/21 (!) 146/69       NPO Status:                                                                                 BMI:   Wt Readings from Last 3 Encounters: 06/28/22 (!) 325 lb (147.4 kg)   12/14/21 295 lb (133.8 kg)   12/03/18 300 lb (136.1 kg)     Body mass index is 57.57 kg/m². CBC:   Lab Results   Component Value Date    WBC 11.4 06/28/2022    RBC 4.57 06/28/2022    HGB 11.3 06/28/2022    HCT 36.2 06/28/2022    MCV 79.2 06/28/2022     06/28/2022       CMP: No results found for: NA, K, CL, CO2, BUN, CREATININE, GFRAA, AGRATIO, LABGLOM, GLUCOSE, GLU, PROT, CALCIUM, BILITOT, ALKPHOS, AST, ALT    POC Tests: No results for input(s): POCGLU, POCNA, POCK, POCCL, POCBUN, POCHEMO, POCHCT in the last 72 hours. Coags: No results found for: PROTIME, INR, APTT    HCG (If Applicable): No results found for: PREGTESTUR, PREGSERUM, HCG, HCGQUANT     ABGs: No results found for: PHART, PO2ART, PGP9TRW, FWS6BKW, BEART, D5JFGXGT     Type & Screen (If Applicable):  Lab Results   Component Value Date    LABRH NEG 06/28/2022       Drug/Infectious Status (If Applicable):  No results found for: HIV, HEPCAB    COVID-19 Screening (If Applicable):   Lab Results   Component Value Date    COVID19 NOT  DETECTED 12/14/2021           Anesthesia Evaluation  Patient summary reviewed and Nursing notes reviewed  Airway: Mallampati: IV          Dental:          Pulmonary:Negative Pulmonary ROS and normal exam                               Cardiovascular:Negative CV ROS                   ROS comment: anemia     Neuro/Psych:   (+) : stable without treatment   Psychiatric history: Anxiety and depression. GI/Hepatic/Renal: Neg GI/Hepatic/Renal ROS            Endo/Other:                      ROS comment: Morbidly oese Abdominal:             Vascular: Other Findings:           Anesthesia Plan      epidural     ASA 2             Anesthetic plan and risks discussed with patient. Plan discussed with attending.     Attending anesthesiologist reviewed and agrees with Preprocedure content                EDIS Hendrickson - MARIUSZ   6/28/2022

## 2022-06-28 NOTE — LACTATION NOTE
Provided and discussed breastfeeding booklet with pt. Pt. Encouraged pt. To call lactation for assistance if needed. Pt. Requests a hand pump for home use, lactation provided.

## 2022-06-28 NOTE — H&P
Obstetric Admission Note        CHIEF COMPLAINT: for delivery    HISTORY OF PRESENT ILLNESS:                     The patient is a 28 y.o.  female @ 38+3 weeks with significant past medical history of anxiety and pp depression who presents with ctxs since 0000. SROM @ [id number]. Good FM. Otherwise normal pregnancy. Past Medical History:        Diagnosis Date    Anemia     Anxiety and depression     Postpartum depression        Medications Prior to Admission:   No medications prior to admission. Allergies:  Bactrim [sulfamethoxazole-trimethoprim]     DATA:      Cervical exam complete   FHT's see records    ASSESSMENT AND PLAN:      Assessment problems  IUP @ 38+3 weeks  Active labor  Maternal obesity    Plan:  - Admit to L&D  - Anticipate Vaginal Delivery  Cong MUNOZ

## 2022-06-29 VITALS
BODY MASS INDEX: 51.91 KG/M2 | DIASTOLIC BLOOD PRESSURE: 67 MMHG | OXYGEN SATURATION: 99 % | SYSTOLIC BLOOD PRESSURE: 133 MMHG | RESPIRATION RATE: 16 BRPM | HEIGHT: 63 IN | WEIGHT: 293 LBS | TEMPERATURE: 98.2 F | HEART RATE: 76 BPM

## 2022-06-29 LAB
ABO CHECK: NORMAL
FETAL SCREEN: NORMAL
GESTATIONAL AGE(WEEKS): NORMAL
INDIRECT COOMBS: NORMAL
RH FACTOR: NORMAL

## 2022-06-29 PROCEDURE — 85461 HEMOGLOBIN FETAL: CPT

## 2022-06-29 PROCEDURE — 86885 COOMBS TEST INDIRECT QUAL: CPT

## 2022-06-29 PROCEDURE — 86901 BLOOD TYPING SEROLOGIC RH(D): CPT

## 2022-06-29 PROCEDURE — 6370000000 HC RX 637 (ALT 250 FOR IP): Performed by: OBSTETRICS & GYNECOLOGY

## 2022-06-29 PROCEDURE — 96372 THER/PROPH/DIAG INJ SC/IM: CPT

## 2022-06-29 PROCEDURE — 6360000002 HC RX W HCPCS: Performed by: OBSTETRICS & GYNECOLOGY

## 2022-06-29 PROCEDURE — 86900 BLOOD TYPING SEROLOGIC ABO: CPT

## 2022-06-29 RX ADMIN — HUMAN RHO(D) IMMUNE GLOBULIN 300 MCG: 300 INJECTION, SOLUTION INTRAMUSCULAR at 13:09

## 2022-06-29 RX ADMIN — DOCUSATE SODIUM 100 MG: 100 CAPSULE, LIQUID FILLED ORAL at 08:42

## 2022-06-29 RX ADMIN — IBUPROFEN 800 MG: 800 TABLET, FILM COATED ORAL at 02:55

## 2022-06-29 RX ADMIN — IBUPROFEN 800 MG: 800 TABLET, FILM COATED ORAL at 11:00

## 2022-06-29 RX ADMIN — ACETAMINOPHEN 1000 MG: 500 TABLET ORAL at 08:41

## 2022-06-29 ASSESSMENT — PAIN DESCRIPTION - DESCRIPTORS
DESCRIPTORS: CRAMPING
DESCRIPTORS: CRAMPING

## 2022-06-29 ASSESSMENT — PAIN SCALES - GENERAL
PAINLEVEL_OUTOF10: 7
PAINLEVEL_OUTOF10: 4
PAINLEVEL_OUTOF10: 7
PAINLEVEL_OUTOF10: 4

## 2022-06-29 ASSESSMENT — PAIN DESCRIPTION - LOCATION
LOCATION: OTHER (COMMENT)
LOCATION: OTHER (COMMENT)

## 2022-06-29 ASSESSMENT — PAIN - FUNCTIONAL ASSESSMENT
PAIN_FUNCTIONAL_ASSESSMENT: ACTIVITIES ARE NOT PREVENTED
PAIN_FUNCTIONAL_ASSESSMENT: ACTIVITIES ARE NOT PREVENTED

## 2022-06-29 NOTE — PROGRESS NOTES
Department of Obstetrics and Gynecology  Labor and Delivery  Post Partum Day #1      SUBJECTIVE:  Patient feeling well with no complaints. Breastfeeding without difficulty. Mild lochia. Patient denies pain. Urination without difficulty. OBJECTIVE:/67   Pulse 76   Temp 98.2 °F (36.8 °C) (Oral)   Resp 16   Ht 5' 3\" (1.6 m)   Wt (!) 325 lb (147.4 kg)   SpO2 99%   Breastfeeding Unknown   BMI 57.57 kg/m²    ABDOMEN:  Soft, non-tender, fundus firm. Extremities: warm and dry. Trace edema    DATA:    Lab Results   Component Value Date    HGB 11.3 2022    HCT 36.2 2022       ASSESSMENT & PLAN:    PPD #1 s/p .         Routine PP care       D/C home today       F/U 4 weeks       Mainor Edmond DO

## 2022-06-29 NOTE — PLAN OF CARE
Problem: Postpartum  Goal: Experiences normal postpartum course  Description:  Postpartum OB-Pregnancy care plan goal which identifies if the mother is experiencing a normal postpartum course  [id number] by Geno Alarcon RN  Outcome: Progressing  Flowsheets (Taken 2022)  Experiences Normal Postpartum Course:   Monitor maternal vital signs   Assess uterine involution     Problem: Postpartum  Goal: Appropriate maternal -  bonding  Description:  Postpartum OB-Pregnancy care plan goal which identifies if the mother and  are bonding appropriately   [id number] by Geno Alracon RN  Outcome: Progressing  Note: Bonding with baby, participating in infant care.        Problem: Postpartum  Goal: Establishment of infant feeding pattern  Description:  Postpartum OB-Pregnancy care plan goal which identifies if the mother is establishing a feeding pattern with their    [id number] by Geno Alarcon RN  Outcome: Progressing  Flowsheets (Taken 2022)  Establishment of Infant Feeding Pattern: Assess breast/bottle feeding     Problem: Postpartum  Goal: Incisions, wounds, or drain sites healing without S/S of infection  [id number] by Geno Alarcon RN  Outcome: Progressing  Flowsheets (Taken 2022)  Incisions, Wounds, or Drain Sites Healing Without Sign and Symptoms of Infection: ADMISSION and DAILY: Assess and document risk factors for pressure ulcer development     Problem: Pain  Goal: Verbalizes/displays adequate comfort level or baseline comfort level   0280 by Geno Alarcon RN  Outcome: Progressing  Flowsheets (Taken 2022)  Verbalizes/displays adequate comfort level or baseline comfort level:   Encourage patient to monitor pain and request assistance   Assess pain using appropriate pain scale   Administer analgesics based on type and severity of pain and evaluate response   Implement non-pharmacological measures as appropriate and evaluate response Problem: Infection - Adult  Goal: Absence of infection at discharge  6/19/5804 6245 by Jase Woods RN  Outcome: Progressing  Flowsheets (Taken 6/28/2022 1945)  Absence of infection at discharge: Assess and monitor for signs and symptoms of infection     Problem: Infection - Adult  Goal: Absence of infection during hospitalization  3/06/1358 9872 by Jase Woods RN  Outcome: Progressing  Flowsheets (Taken 6/28/2022 1945)  Absence of infection during hospitalization: Assess and monitor for signs and symptoms of infection     Problem: Safety - Adult  Goal: Free from fall injury  4/60/7391 3214 by Jase Woods RN  Outcome: Progressing  Flowsheets (Taken 6/28/2022 1945)  Free From Fall Injury: Instruct family/caregiver on patient safety     Problem: Discharge Planning  Goal: Discharge to home or other facility with appropriate resources  6/57/1926 1239 by Jase Woods RN  Outcome: Progressing  Flowsheets (Taken 6/28/2022 1945)  Discharge to home or other facility with appropriate resources: Identify barriers to discharge with patient and caregiver    Plan of care discussed with mother and she contributes to goal setting and voices understanding of plan of care.

## 2022-06-29 NOTE — PROGRESS NOTES
Circumcision Care  · Always wash hands. · Remove old gauze with each diaper change. · Gently wash the penis with warm water with each diaper change to remove stool or urine and pat dry - avoid rubbing. (Some swelling and yellow crust formation around the site is normal. Do not attempt to remove the film that forms on the penis. This will go away by itself.)  · Apply Vaseline/petroleum jelly liberally to penis with every diaper change until healed' approximately 7-10 days. The Vaseline prevents the scab from sticking to the diaper and helps protect the healing area. · If diaper or gauze sticks to penis wring warm water over the top to allow it to release on its own. · Do NOT submerge in water until circumcision is healed. · Make sure diapers are fastened loosely to decrease irritation of the penis. · Wash hands after diaper change. Parents educated on circumcision care, verbalized understanding.

## 2022-06-29 NOTE — FLOWSHEET NOTE
Post birth warning signs education paper given and reviewed, teaching complete. [place] postpartum depression screening discussed with patient, instructed to contact her healthcare provider if her score is > 10. Patient voiced understanding. Mother's blood type is A -. Baby's blood type is A+.   Mother did receive Rhogam .

## 2022-06-29 NOTE — PLAN OF CARE
Problem: Postpartum  Goal: Experiences normal postpartum course  Description:  Postpartum OB-Pregnancy care plan goal which identifies if the mother is experiencing a normal postpartum course  Outcome: Completed  Goal: Appropriate maternal -  bonding  Description:  Postpartum OB-Pregnancy care plan goal which identifies if the mother and  are bonding appropriately  Outcome: Completed  Goal: Establishment of infant feeding pattern  Description:  Postpartum OB-Pregnancy care plan goal which identifies if the mother is establishing a feeding pattern with their   Outcome: Completed  Goal: Incisions, wounds, or drain sites healing without S/S of infection  Outcome: Completed     Problem: Pain  Goal: Verbalizes/displays adequate comfort level or baseline comfort level  Outcome: Completed     Problem: Infection - Adult  Goal: Absence of infection at discharge  Outcome: Completed  Goal: Absence of infection during hospitalization  Outcome: Completed     Problem: Safety - Adult  Goal: Free from fall injury  Outcome: Completed     Problem: Discharge Planning  Goal: Discharge to home or other facility with appropriate resources  Outcome: Completed   Care plan reviewed with patient and she contributes to goal setting and voices understanding of plan of care.

## 2022-06-29 NOTE — ANESTHESIA POSTPROCEDURE EVALUATION
Department of Anesthesiology  Postprocedure Note    Patient: Margot Franco  MRN: [id number]  YOB: 1990  Date of evaluation: 6/29/2022      Procedure Summary     Date: 06/28/22 Room / Location:     Anesthesia Start: 2279 Anesthesia Stop: 8897    Procedure: Labor Analgesia Diagnosis:     Scheduled Providers:  Responsible Provider: Eliazar Valencia MD    Anesthesia Type: epidural ASA Status: 2          Anesthesia Type: No value filed.     Bridger Phase I: Bridger Score: 9    Bridger Phase II: Bridger Score: 10      Anesthesia Post Evaluation    Patient location during evaluation: floor  Patient participation: complete - patient participated  Level of consciousness: awake and alert  Airway patency: patent  Nausea & Vomiting: no nausea and no vomiting  Complications: no  Cardiovascular status: hemodynamically stable  Respiratory status: acceptable, room air and spontaneous ventilation  Hydration status: stable

## 2023-12-14 ENCOUNTER — HOSPITAL ENCOUNTER (EMERGENCY)
Age: 33
Discharge: HOME | End: 2023-12-14
Payer: COMMERCIAL

## 2023-12-14 VITALS
WEIGHT: 293 LBS | BODY MASS INDEX: 59.17 KG/M2 | HEART RATE: 80 BPM | TEMPERATURE: 98.6 F | RESPIRATION RATE: 16 BRPM | OXYGEN SATURATION: 96 % | DIASTOLIC BLOOD PRESSURE: 86 MMHG | SYSTOLIC BLOOD PRESSURE: 134 MMHG

## 2023-12-14 DIAGNOSIS — J06.9 UPPER RESPIRATORY TRACT INFECTION, UNSPECIFIED TYPE: Primary | ICD-10-CM

## 2023-12-14 LAB
S PYO AG THROAT QL: NEGATIVE
SARS-COV-2 RDRP RESP QL NAA+PROBE: NOT  DETECTED

## 2023-12-14 PROCEDURE — 99213 OFFICE O/P EST LOW 20 MIN: CPT

## 2023-12-14 PROCEDURE — 99213 OFFICE O/P EST LOW 20 MIN: CPT | Performed by: NURSE PRACTITIONER

## 2023-12-14 PROCEDURE — 87651 STREP A DNA AMP PROBE: CPT

## 2023-12-14 PROCEDURE — 87635 SARS-COV-2 COVID-19 AMP PRB: CPT

## 2023-12-14 RX ORDER — PREDNISONE 20 MG/1
20 TABLET ORAL 2 TIMES DAILY
Qty: 10 TABLET | Refills: 0 | Status: SHIPPED | OUTPATIENT
Start: 2023-12-14 | End: 2023-12-19

## 2023-12-14 RX ORDER — FLUTICASONE PROPIONATE 50 MCG
2 SPRAY, SUSPENSION (ML) NASAL DAILY
Qty: 16 G | Refills: 0 | COMMUNITY
Start: 2023-12-14

## 2023-12-14 RX ORDER — ALBUTEROL SULFATE 90 UG/1
2 AEROSOL, METERED RESPIRATORY (INHALATION) 4 TIMES DAILY PRN
Qty: 18 G | Refills: 0 | Status: SHIPPED | OUTPATIENT
Start: 2023-12-14

## 2023-12-14 RX ORDER — GUAIFENESIN AND DEXTROMETHORPHAN HYDROBROMIDE 1200; 60 MG/1; MG/1
1 TABLET, EXTENDED RELEASE ORAL EVERY 12 HOURS PRN
Qty: 28 TABLET | Refills: 0 | Status: SHIPPED | OUTPATIENT
Start: 2023-12-14

## 2023-12-14 ASSESSMENT — PAIN - FUNCTIONAL ASSESSMENT: PAIN_FUNCTIONAL_ASSESSMENT: 0-10

## 2023-12-14 ASSESSMENT — PAIN SCALES - GENERAL: PAINLEVEL_OUTOF10: 8

## 2023-12-14 ASSESSMENT — PAIN DESCRIPTION - LOCATION: LOCATION: GENERALIZED

## 2023-12-14 NOTE — ED TRIAGE NOTES
Nkechi Hawkins arrives to room with complaint of  sore throat, cough for past 2 weeks, worsening this week. Not able to sleep.   Bilateral ear pain and fullness, back of neck muscle soreness , chest congestion all started last night

## 2023-12-26 ENCOUNTER — HOSPITAL ENCOUNTER (OUTPATIENT)
Age: 33
Discharge: HOME | End: 2023-12-26
Payer: MEDICAID

## 2023-12-26 ENCOUNTER — OFFICE VISIT (OUTPATIENT)
Dept: FAMILY MEDICINE CLINIC | Age: 33
End: 2023-12-26
Payer: MEDICAID

## 2023-12-26 ENCOUNTER — HOSPITAL ENCOUNTER (OUTPATIENT)
Dept: GENERAL RADIOLOGY | Age: 33
Discharge: HOME | End: 2023-12-26
Payer: MEDICAID

## 2023-12-26 ENCOUNTER — TELEPHONE (OUTPATIENT)
Dept: FAMILY MEDICINE CLINIC | Age: 33
End: 2023-12-26

## 2023-12-26 VITALS
HEART RATE: 93 BPM | BODY MASS INDEX: 51.91 KG/M2 | WEIGHT: 293 LBS | DIASTOLIC BLOOD PRESSURE: 74 MMHG | HEIGHT: 63 IN | SYSTOLIC BLOOD PRESSURE: 122 MMHG | RESPIRATION RATE: 18 BRPM | TEMPERATURE: 98.6 F | OXYGEN SATURATION: 97 %

## 2023-12-26 DIAGNOSIS — Z00.00 WELL ADULT EXAM: ICD-10-CM

## 2023-12-26 DIAGNOSIS — E66.01 CLASS 3 SEVERE OBESITY DUE TO EXCESS CALORIES WITHOUT SERIOUS COMORBIDITY WITH BODY MASS INDEX (BMI) OF 50.0 TO 59.9 IN ADULT (HCC): ICD-10-CM

## 2023-12-26 DIAGNOSIS — Z13.220 SCREENING, LIPID: ICD-10-CM

## 2023-12-26 DIAGNOSIS — R53.83 FATIGUE, UNSPECIFIED TYPE: ICD-10-CM

## 2023-12-26 DIAGNOSIS — F33.2 SEVERE EPISODE OF RECURRENT MAJOR DEPRESSIVE DISORDER, WITHOUT PSYCHOTIC FEATURES (HCC): Primary | ICD-10-CM

## 2023-12-26 DIAGNOSIS — R06.02 SHORTNESS OF BREATH: ICD-10-CM

## 2023-12-26 DIAGNOSIS — F41.9 ANXIETY: ICD-10-CM

## 2023-12-26 DIAGNOSIS — Z79.899 ENCOUNTER FOR LONG-TERM (CURRENT) USE OF HIGH-RISK MEDICATION: ICD-10-CM

## 2023-12-26 PROBLEM — O42.90 ROM (RUPTURE OF MEMBRANES), PREMATURE: Status: RESOLVED | Noted: 2022-06-28 | Resolved: 2023-12-26

## 2023-12-26 PROBLEM — E66.813 CLASS 3 SEVERE OBESITY DUE TO EXCESS CALORIES WITHOUT SERIOUS COMORBIDITY WITH BODY MASS INDEX (BMI) OF 50.0 TO 59.9 IN ADULT: Status: ACTIVE | Noted: 2023-12-26

## 2023-12-26 PROBLEM — O99.212 OBESITY AFFECTING PREGNANCY IN SECOND TRIMESTER: Status: RESOLVED | Noted: 2018-10-08 | Resolved: 2023-12-26

## 2023-12-26 PROBLEM — O47.9 FALSE LABOR: Status: RESOLVED | Noted: 2022-06-28 | Resolved: 2023-12-26

## 2023-12-26 PROBLEM — O99.213 OBESITY AFFECTING PREGNANCY IN THIRD TRIMESTER: Status: RESOLVED | Noted: 2018-12-03 | Resolved: 2023-12-26

## 2023-12-26 LAB
INFLUENZA VIRUS A RNA: NORMAL
INFLUENZA VIRUS B RNA: NORMAL
Lab: NORMAL
QC PASS/FAIL: NORMAL
SARS-COV-2 RDRP RESP QL NAA+PROBE: NEGATIVE

## 2023-12-26 PROCEDURE — 87502 INFLUENZA DNA AMP PROBE: CPT | Performed by: NURSE PRACTITIONER

## 2023-12-26 PROCEDURE — 71046 X-RAY EXAM CHEST 2 VIEWS: CPT

## 2023-12-26 PROCEDURE — 99215 OFFICE O/P EST HI 40 MIN: CPT | Performed by: NURSE PRACTITIONER

## 2023-12-26 PROCEDURE — 87635 SARS-COV-2 COVID-19 AMP PRB: CPT | Performed by: NURSE PRACTITIONER

## 2023-12-26 RX ORDER — CLOTRIMAZOLE AND BETAMETHASONE DIPROPIONATE 10; .64 MG/G; MG/G
CREAM TOPICAL
COMMUNITY
Start: 2023-10-05

## 2023-12-26 RX ORDER — CITALOPRAM 20 MG/1
20 TABLET ORAL DAILY
Qty: 30 TABLET | Refills: 0 | Status: SHIPPED | OUTPATIENT
Start: 2023-12-26

## 2023-12-26 RX ORDER — HYDROXYZINE HYDROCHLORIDE 25 MG/1
25 TABLET, FILM COATED ORAL EVERY 8 HOURS PRN
Qty: 30 TABLET | Refills: 0 | Status: SHIPPED | OUTPATIENT
Start: 2023-12-26 | End: 2024-01-05

## 2024-01-13 ENCOUNTER — HOSPITAL ENCOUNTER (OUTPATIENT)
Age: 34
Discharge: HOME | End: 2024-01-13
Payer: COMMERCIAL

## 2024-01-13 DIAGNOSIS — Z00.00 WELL ADULT EXAM: ICD-10-CM

## 2024-01-13 DIAGNOSIS — Z13.220 SCREENING, LIPID: ICD-10-CM

## 2024-01-13 DIAGNOSIS — Z79.899 ENCOUNTER FOR LONG-TERM (CURRENT) USE OF HIGH-RISK MEDICATION: ICD-10-CM

## 2024-01-13 DIAGNOSIS — F41.9 ANXIETY: ICD-10-CM

## 2024-01-13 DIAGNOSIS — R53.83 FATIGUE, UNSPECIFIED TYPE: ICD-10-CM

## 2024-01-13 DIAGNOSIS — F33.2 SEVERE EPISODE OF RECURRENT MAJOR DEPRESSIVE DISORDER, WITHOUT PSYCHOTIC FEATURES (HCC): ICD-10-CM

## 2024-01-13 LAB
25(OH)D3 SERPL-MCNC: 33 NG/ML (ref 30–100)
ALBUMIN SERPL BCG-MCNC: 4.1 G/DL (ref 3.5–5.1)
ALP SERPL-CCNC: 76 U/L (ref 38–126)
ALT SERPL W/O P-5'-P-CCNC: 48 U/L (ref 11–66)
ANION GAP SERPL CALC-SCNC: 13 MEQ/L (ref 8–16)
AST SERPL-CCNC: 24 U/L (ref 5–40)
BASOPHILS ABSOLUTE: 0 THOU/MM3 (ref 0–0.1)
BASOPHILS NFR BLD AUTO: 0.4 %
BILIRUB SERPL-MCNC: < 0.2 MG/DL (ref 0.3–1.2)
BUN SERPL-MCNC: 8 MG/DL (ref 7–22)
CALCIUM SERPL-MCNC: 9 MG/DL (ref 8.5–10.5)
CHLORIDE SERPL-SCNC: 105 MEQ/L (ref 98–111)
CHOLEST SERPL-MCNC: 171 MG/DL (ref 100–199)
CO2 SERPL-SCNC: 22 MEQ/L (ref 23–33)
CREAT SERPL-MCNC: 0.5 MG/DL (ref 0.4–1.2)
DEPRECATED RDW RBC AUTO: 47.2 FL (ref 35–45)
EOSINOPHIL NFR BLD AUTO: 3 %
EOSINOPHILS ABSOLUTE: 0.2 THOU/MM3 (ref 0–0.4)
ERYTHROCYTE [DISTWIDTH] IN BLOOD BY AUTOMATED COUNT: 15.6 % (ref 11.5–14.5)
GFR SERPL CREATININE-BSD FRML MDRD: > 60 ML/MIN/1.73M2
GLUCOSE SERPL-MCNC: 103 MG/DL (ref 70–108)
HCT VFR BLD AUTO: 40.4 % (ref 37–47)
HDLC SERPL-MCNC: 60 MG/DL
HGB BLD-MCNC: 12.5 GM/DL (ref 12–16)
IMM GRANULOCYTES # BLD AUTO: 0.01 THOU/MM3 (ref 0–0.07)
IMM GRANULOCYTES NFR BLD AUTO: 0.2 %
LDLC SERPL CALC-MCNC: 92 MG/DL
LYMPHOCYTES ABSOLUTE: 2.3 THOU/MM3 (ref 1–4.8)
LYMPHOCYTES NFR BLD AUTO: 45.7 %
MCH RBC QN AUTO: 25.9 PG (ref 26–33)
MCHC RBC AUTO-ENTMCNC: 30.9 GM/DL (ref 32.2–35.5)
MCV RBC AUTO: 83.8 FL (ref 81–99)
MONOCYTES ABSOLUTE: 0.5 THOU/MM3 (ref 0.4–1.3)
MONOCYTES NFR BLD AUTO: 9.9 %
NEUTROPHILS NFR BLD AUTO: 40.8 %
NRBC BLD AUTO-RTO: 0 /100 WBC
PLATELET # BLD AUTO: 295 THOU/MM3 (ref 130–400)
PMV BLD AUTO: 9.7 FL (ref 9.4–12.4)
POTASSIUM SERPL-SCNC: 4.3 MEQ/L (ref 3.5–5.2)
PROT SERPL-MCNC: 7.3 G/DL (ref 6.1–8)
RBC # BLD AUTO: 4.82 MILL/MM3 (ref 4.2–5.4)
SEGMENTED NEUTROPHILS ABSOLUTE COUNT: 2 THOU/MM3 (ref 1.8–7.7)
SODIUM SERPL-SCNC: 140 MEQ/L (ref 135–145)
TRIGL SERPL-MCNC: 96 MG/DL (ref 0–199)
TSH SERPL DL<=0.005 MIU/L-ACNC: 3.61 UIU/ML (ref 0.4–4.2)
WBC # BLD AUTO: 5 THOU/MM3 (ref 4.8–10.8)

## 2024-01-13 PROCEDURE — 84443 ASSAY THYROID STIM HORMONE: CPT

## 2024-01-13 PROCEDURE — 85025 COMPLETE CBC W/AUTO DIFF WBC: CPT

## 2024-01-13 PROCEDURE — 82306 VITAMIN D 25 HYDROXY: CPT

## 2024-01-13 PROCEDURE — 36415 COLL VENOUS BLD VENIPUNCTURE: CPT

## 2024-01-13 PROCEDURE — 80053 COMPREHEN METABOLIC PANEL: CPT

## 2024-01-13 PROCEDURE — 80061 LIPID PANEL: CPT

## 2024-01-15 ENCOUNTER — TELEPHONE (OUTPATIENT)
Dept: FAMILY MEDICINE CLINIC | Age: 34
End: 2024-01-15

## 2024-01-15 NOTE — TELEPHONE ENCOUNTER
----- Message from EDIS Thorne - CNP sent at 1/15/2024 10:48 AM EST -----  Your labs look good. Kidney and liver function is normal. Blood counts are stable. Cholesterol numbers are at goal.

## 2024-01-16 ENCOUNTER — OFFICE VISIT (OUTPATIENT)
Dept: FAMILY MEDICINE CLINIC | Age: 34
End: 2024-01-16
Payer: COMMERCIAL

## 2024-01-16 VITALS
TEMPERATURE: 98.1 F | WEIGHT: 293 LBS | BODY MASS INDEX: 51.91 KG/M2 | SYSTOLIC BLOOD PRESSURE: 122 MMHG | HEIGHT: 63 IN | RESPIRATION RATE: 18 BRPM | DIASTOLIC BLOOD PRESSURE: 70 MMHG | OXYGEN SATURATION: 98 % | HEART RATE: 76 BPM

## 2024-01-16 DIAGNOSIS — F41.9 ANXIETY: ICD-10-CM

## 2024-01-16 DIAGNOSIS — F33.1 MODERATE EPISODE OF RECURRENT MAJOR DEPRESSIVE DISORDER (HCC): ICD-10-CM

## 2024-01-16 DIAGNOSIS — Z00.00 ENCOUNTER FOR WELL ADULT EXAM WITHOUT ABNORMAL FINDINGS: Primary | ICD-10-CM

## 2024-01-16 DIAGNOSIS — E66.01 CLASS 3 SEVERE OBESITY DUE TO EXCESS CALORIES WITHOUT SERIOUS COMORBIDITY WITH BODY MASS INDEX (BMI) OF 50.0 TO 59.9 IN ADULT (HCC): ICD-10-CM

## 2024-01-16 PROCEDURE — 99395 PREV VISIT EST AGE 18-39: CPT | Performed by: NURSE PRACTITIONER

## 2024-01-16 PROCEDURE — G8484 FLU IMMUNIZE NO ADMIN: HCPCS | Performed by: NURSE PRACTITIONER

## 2024-01-16 RX ORDER — HYDROXYZINE HYDROCHLORIDE 25 MG/1
25 TABLET, FILM COATED ORAL 3 TIMES DAILY PRN
Qty: 60 TABLET | Refills: 1 | Status: SHIPPED | OUTPATIENT
Start: 2024-01-16

## 2024-01-16 RX ORDER — HYDROXYZINE HYDROCHLORIDE 10 MG/1
10 TABLET, FILM COATED ORAL 3 TIMES DAILY PRN
COMMUNITY
End: 2024-01-16 | Stop reason: SDUPTHER

## 2024-01-16 RX ORDER — CITALOPRAM 40 MG/1
40 TABLET ORAL DAILY
Qty: 90 TABLET | Refills: 0 | Status: SHIPPED | OUTPATIENT
Start: 2024-01-16 | End: 2024-04-15

## 2024-01-16 ASSESSMENT — ENCOUNTER SYMPTOMS
SORE THROAT: 0
EYE PAIN: 0
ABDOMINAL PAIN: 0
VOMITING: 0
SHORTNESS OF BREATH: 0
NAUSEA: 0
CHEST TIGHTNESS: 0
COUGH: 0

## 2024-01-16 ASSESSMENT — PATIENT HEALTH QUESTIONNAIRE - PHQ9: DEPRESSION UNABLE TO ASSESS: PT REFUSES

## 2024-01-16 NOTE — PROGRESS NOTES
Well Adult Note  Name: Nori Cabral Today’s Date: 2024   MRN: [id number] Sex: Female   Age: 33 y.o. Ethnicity: Non- / Non    : 1990 Race: White (non-)      Nori Cabral is here for well adult exam.  History:  Patient states she feels Celexa is working but is wearing off before she takes it again. Has been taking it a few hours before bedtime and sleeps well. Using hydroxyzine as needed for high anxiety situations or sleep.   States she has started doing intermittent fasting. 8 hour eating window and then 16 hours fasting. Has ordered Home  and is working on eating better.   Exercise - chasing after her kids. Too cold to get outside right now.    Review of Systems   Constitutional:  Negative for chills and fever.   HENT:  Negative for congestion and sore throat.    Eyes:  Negative for pain and visual disturbance.   Respiratory:  Negative for cough, chest tightness and shortness of breath.    Cardiovascular:  Negative for chest pain and palpitations.   Gastrointestinal:  Negative for abdominal pain, nausea and vomiting.   Genitourinary:  Negative for decreased urine volume and dysuria.   Skin:  Negative for rash.   Neurological:  Negative for dizziness, weakness and headaches.   Psychiatric/Behavioral:  Negative for dysphoric mood. The patient is not nervous/anxious.        Allergies   Allergen Reactions    Bactrim [Sulfamethoxazole-Trimethoprim] Nausea Only         Prior to Visit Medications    Medication Sig Taking? Authorizing Provider   citalopram (CELEXA) 40 MG tablet Take 1 tablet by mouth daily Yes Mckayla Hurtado APRN - CNP   hydrOXYzine HCl (ATARAX) 25 MG tablet Take 1 tablet by mouth 3 times daily as needed for Anxiety Yes Mckayla Hurtado APRN - CNP   clotrimazole-betamethasone (LOTRISONE) 1-0.05 % cream apply topically to affected area twice a day Yes Provider, MD Jaden   fluticasone (FLONASE) 50 MCG/ACT nasal spray 2 sprays by Each Nostril route daily Yes

## 2024-01-16 NOTE — PATIENT INSTRUCTIONS
Reach and stay at your healthy weight. This will lower your risk for many health problems.   Take care of your mental health. Try to stay connected with friends, family, and community, and find ways to manage stress.     If you're feeling depressed or hopeless, talk to someone. A counselor can help. If you don't have a counselor, talk to your doctor.   Talk to your doctor if you think you may have a problem with alcohol or drug use. This includes prescription medicines, marijuana, and other drugs.     Avoid tobacco and nicotine: Don't smoke, vape, or chew. If you need help quitting, talk to your doctor.   Practice safer sex. Getting tested, using condoms or dental dams, and limiting sex partners can help prevent STIs.     Use birth control if it's important to you to prevent pregnancy. Talk with your doctor about your choices and what might be best for you.   Prevent problems where you can. Protect your skin from too much sun, wash your hands, brush your teeth twice a day, and wear a seat belt in the car.   Where can you learn more?  Go to https://www.Jigs[employer].net/patientEd and enter P072 to learn more about \"Well Visit, Ages 18 to 65: Care Instructions.\"  Current as of: August 6, 2023               Content Version: 13.9  © 2006-2023 [employer].   Care instructions adapted under license by [employer]. If you have questions about a medical condition or this instruction, always ask your healthcare professional. [employer] disclaims any warranty or liability for your use of this information.           A Healthy Lifestyle: Care Instructions  A healthy lifestyle can help you feel good, have more energy, and stay at a weight that's healthy for you. You can share a healthy lifestyle with your friends and family. And you can do it on your own.    Eat meals with your friends or family. You could try cooking together.   Plan activities with other people. Go for a walk with a friend, try a

## 2024-01-16 NOTE — ASSESSMENT & PLAN NOTE
Borderline controlled, changes made today: will increase citalopram to 40 mg, continue hydroxyzine as needed and lifestyle modifications recommended

## 2024-01-16 NOTE — ASSESSMENT & PLAN NOTE
Congratulations on the weight loss you have had so far. Continue to work on low calorie diet and increase exercise.

## 2024-01-16 NOTE — ASSESSMENT & PLAN NOTE
Improving. Will increase citalopram to 40 mg. Had sexual side effects when she started citalopram but had also taken hydroxyzine. Will let me know if side effects persist. Will not take hydroxyzine prior to intercourse.

## 2024-02-27 ENCOUNTER — OFFICE VISIT (OUTPATIENT)
Dept: FAMILY MEDICINE CLINIC | Age: 34
End: 2024-02-27
Payer: COMMERCIAL

## 2024-02-27 VITALS
TEMPERATURE: 98.5 F | SYSTOLIC BLOOD PRESSURE: 120 MMHG | WEIGHT: 293 LBS | BODY MASS INDEX: 51.91 KG/M2 | RESPIRATION RATE: 16 BRPM | DIASTOLIC BLOOD PRESSURE: 70 MMHG | HEIGHT: 63 IN | OXYGEN SATURATION: 96 % | HEART RATE: 95 BPM

## 2024-02-27 DIAGNOSIS — F41.9 ANXIETY: ICD-10-CM

## 2024-02-27 DIAGNOSIS — F33.2 SEVERE EPISODE OF RECURRENT MAJOR DEPRESSIVE DISORDER, WITHOUT PSYCHOTIC FEATURES (HCC): Primary | ICD-10-CM

## 2024-02-27 PROCEDURE — G8417 CALC BMI ABV UP PARAM F/U: HCPCS | Performed by: NURSE PRACTITIONER

## 2024-02-27 PROCEDURE — G8427 DOCREV CUR MEDS BY ELIG CLIN: HCPCS | Performed by: NURSE PRACTITIONER

## 2024-02-27 PROCEDURE — 1036F TOBACCO NON-USER: CPT | Performed by: NURSE PRACTITIONER

## 2024-02-27 PROCEDURE — 99214 OFFICE O/P EST MOD 30 MIN: CPT | Performed by: NURSE PRACTITIONER

## 2024-02-27 PROCEDURE — G8484 FLU IMMUNIZE NO ADMIN: HCPCS | Performed by: NURSE PRACTITIONER

## 2024-02-27 RX ORDER — BUPROPION HYDROCHLORIDE 150 MG/1
150 TABLET, EXTENDED RELEASE ORAL 2 TIMES DAILY
Qty: 60 TABLET | Refills: 0 | Status: SHIPPED | OUTPATIENT
Start: 2024-02-27

## 2024-02-27 ASSESSMENT — PATIENT HEALTH QUESTIONNAIRE - PHQ9
3. TROUBLE FALLING OR STAYING ASLEEP: 3
SUM OF ALL RESPONSES TO PHQ QUESTIONS 1-9: 19
5. POOR APPETITE OR OVEREATING: 3
SUM OF ALL RESPONSES TO PHQ9 QUESTIONS 1 & 2: 3
SUM OF ALL RESPONSES TO PHQ QUESTIONS 1-9: 19
SUM OF ALL RESPONSES TO PHQ QUESTIONS 1-9: 19
10. IF YOU CHECKED OFF ANY PROBLEMS, HOW DIFFICULT HAVE THESE PROBLEMS MADE IT FOR YOU TO DO YOUR WORK, TAKE CARE OF THINGS AT HOME, OR GET ALONG WITH OTHER PEOPLE: 2
1. LITTLE INTEREST OR PLEASURE IN DOING THINGS: 3
4. FEELING TIRED OR HAVING LITTLE ENERGY: 3
8. MOVING OR SPEAKING SO SLOWLY THAT OTHER PEOPLE COULD HAVE NOTICED. OR THE OPPOSITE, BEING SO FIGETY OR RESTLESS THAT YOU HAVE BEEN MOVING AROUND A LOT MORE THAN USUAL: 3
SUM OF ALL RESPONSES TO PHQ QUESTIONS 1-9: 19
9. THOUGHTS THAT YOU WOULD BE BETTER OFF DEAD, OR OF HURTING YOURSELF: 0
7. TROUBLE CONCENTRATING ON THINGS, SUCH AS READING THE NEWSPAPER OR WATCHING TELEVISION: 3
2. FEELING DOWN, DEPRESSED OR HOPELESS: 0

## 2024-02-27 NOTE — PROGRESS NOTES
[facility]  [address]  [facility] [address]  Dept: [phone]  Dept Fax: [phone]  Loc: [phone]  PROGRESS NOTE      Visit Date: 2/28/2024    Nori Cabral is a 33 y.o. female who presents today for:  Chief Complaint   Patient presents with    Medication Adjustment     6 week follow up.  Pt stated she still feels very unmotivated.  Pt stated she feels \"blah\"  would like to discuss a PRN medication to help with this. Would like to discuss ADHD meds         Subjective:  Patient presents for follow up on depression and anxiety. PHQ 9 is 19. On 12/26/2023 was 24. States anxiety is better - states she no longer gets upset when house is a mess. States she is now too relaxed. States she just feels blah and doesn't care about anything. Has history of significant depression and suicidal ideation in past. Denies any suicidal ideation or thoughts of self harm at this time. States she is just blah all the time and has no motivation. Was also on ADHD medication as an adolescent and is wondering if she should be back on the ADHD medication. Has also been on Wellbutrin in the past and felt this helped.        Review of Systems   Constitutional:  Negative for chills and fever.   HENT:  Negative for congestion and sore throat.    Eyes:  Negative for pain and visual disturbance.   Respiratory:  Negative for cough, chest tightness and shortness of breath.    Cardiovascular:  Negative for chest pain and palpitations.   Gastrointestinal:  Negative for abdominal pain, nausea and vomiting.   Genitourinary:  Negative for decreased urine volume and dysuria.   Skin:  Negative for rash.   Neurological:  Negative for dizziness, weakness and headaches.   Psychiatric/Behavioral:  Positive for decreased concentration and dysphoric mood. Negative for suicidal ideas. The patient is not nervous/anxious.      Patient Active Problem List   Diagnosis    Severe episode

## 2024-02-28 ASSESSMENT — ENCOUNTER SYMPTOMS
COUGH: 0
SORE THROAT: 0
NAUSEA: 0
VOMITING: 0
CHEST TIGHTNESS: 0
SHORTNESS OF BREATH: 0
EYE PAIN: 0
ABDOMINAL PAIN: 0

## 2024-03-18 ENCOUNTER — OFFICE VISIT (OUTPATIENT)
Dept: FAMILY MEDICINE CLINIC | Age: 34
End: 2024-03-18
Payer: COMMERCIAL

## 2024-03-18 VITALS
TEMPERATURE: 97.9 F | HEART RATE: 81 BPM | OXYGEN SATURATION: 98 % | SYSTOLIC BLOOD PRESSURE: 122 MMHG | DIASTOLIC BLOOD PRESSURE: 70 MMHG | HEIGHT: 63 IN | WEIGHT: 293 LBS | BODY MASS INDEX: 51.91 KG/M2 | RESPIRATION RATE: 16 BRPM

## 2024-03-18 DIAGNOSIS — F41.9 ANXIETY: ICD-10-CM

## 2024-03-18 DIAGNOSIS — E66.01 CLASS 3 SEVERE OBESITY DUE TO EXCESS CALORIES WITHOUT SERIOUS COMORBIDITY WITH BODY MASS INDEX (BMI) OF 50.0 TO 59.9 IN ADULT (HCC): ICD-10-CM

## 2024-03-18 DIAGNOSIS — F33.1 MODERATE EPISODE OF RECURRENT MAJOR DEPRESSIVE DISORDER (HCC): Primary | ICD-10-CM

## 2024-03-18 PROBLEM — F33.2 SEVERE EPISODE OF RECURRENT MAJOR DEPRESSIVE DISORDER, WITHOUT PSYCHOTIC FEATURES (HCC): Status: RESOLVED | Noted: 2023-12-26 | Resolved: 2024-03-18

## 2024-03-18 PROCEDURE — G8417 CALC BMI ABV UP PARAM F/U: HCPCS | Performed by: NURSE PRACTITIONER

## 2024-03-18 PROCEDURE — 99213 OFFICE O/P EST LOW 20 MIN: CPT | Performed by: NURSE PRACTITIONER

## 2024-03-18 PROCEDURE — G8484 FLU IMMUNIZE NO ADMIN: HCPCS | Performed by: NURSE PRACTITIONER

## 2024-03-18 PROCEDURE — G8427 DOCREV CUR MEDS BY ELIG CLIN: HCPCS | Performed by: NURSE PRACTITIONER

## 2024-03-18 PROCEDURE — 1036F TOBACCO NON-USER: CPT | Performed by: NURSE PRACTITIONER

## 2024-03-18 RX ORDER — HYDROXYZINE HYDROCHLORIDE 25 MG/1
25 TABLET, FILM COATED ORAL 3 TIMES DAILY PRN
Qty: 60 TABLET | Refills: 1 | Status: CANCELLED | OUTPATIENT
Start: 2024-03-18

## 2024-03-18 RX ORDER — BUPROPION HYDROCHLORIDE 150 MG/1
150 TABLET, EXTENDED RELEASE ORAL 2 TIMES DAILY
Qty: 180 TABLET | Refills: 0 | Status: SHIPPED | OUTPATIENT
Start: 2024-03-18

## 2024-03-18 RX ORDER — LEVONORGESTREL 52 MG/1
INTRAUTERINE DEVICE INTRAUTERINE
COMMUNITY

## 2024-03-18 RX ORDER — CITALOPRAM 40 MG/1
40 TABLET ORAL DAILY
Qty: 90 TABLET | Refills: 0 | Status: SHIPPED | OUTPATIENT
Start: 2024-03-18 | End: 2024-06-16

## 2024-03-18 ASSESSMENT — ENCOUNTER SYMPTOMS
COUGH: 0
EYE PAIN: 0
ABDOMINAL PAIN: 0
SORE THROAT: 0
SHORTNESS OF BREATH: 0
NAUSEA: 0
CHEST TIGHTNESS: 0
VOMITING: 0

## 2024-03-18 NOTE — PROGRESS NOTES
[facility]X [facility] PROFESSIONAL [facility]  [address]  [facility] [address]  Dept: [phone]  Loc: [phone]     Nori Cabral (:  1990) is a 34 y.o. female, here for evaluation of the following chief complaint(s):  Depression (Pt stated things are better.  Denies any concerns today. )      ASSESSMENT/PLAN:  1. Moderate episode of recurrent major depressive disorder (HCC)  -     buPROPion (WELLBUTRIN SR) 150 MG extended release tablet; Take 1 tablet by mouth 2 times daily, Disp-180 tablet, R-0Normal  -     citalopram (CELEXA) 40 MG tablet; Take 1 tablet by mouth daily, Disp-90 tablet, R-0Normal  2. Anxiety  -     citalopram (CELEXA) 40 MG tablet; Take 1 tablet by mouth daily, Disp-90 tablet, R-0Normal  3. Class 3 severe obesity due to excess calories without serious comorbidity with body mass index (BMI) of 50.0 to 59.9 in adult (HCC)    Improved. Continue current medication.    Return in about 3 months (around 2024) for 3 mo fu.    SUBJECTIVE/OBJECTIVE:  Patient presents for follow up on depression and anxiety. Feels the combination of Celexa and Wellbutrin has been working well. Only has to use hydroxyzine occasionally. Usually just before anxiety provoking situations. No side effects.        Review of Systems   Constitutional:  Negative for chills and fever.   HENT:  Negative for congestion and sore throat.    Eyes:  Negative for pain and visual disturbance.   Respiratory:  Negative for cough, chest tightness and shortness of breath.    Cardiovascular:  Negative for chest pain and palpitations.   Gastrointestinal:  Negative for abdominal pain, nausea and vomiting.   Genitourinary:  Negative for decreased urine volume and dysuria.   Skin:  Negative for rash.   Neurological:  Negative for dizziness, weakness and headaches.   Psychiatric/Behavioral:  Negative for dysphoric mood. The patient is not nervous/anxious.        Physical Exam  Vitals reviewed.

## 2024-04-18 DIAGNOSIS — F33.1 MODERATE EPISODE OF RECURRENT MAJOR DEPRESSIVE DISORDER (HCC): ICD-10-CM

## 2024-04-18 DIAGNOSIS — F41.9 ANXIETY: ICD-10-CM

## 2024-04-18 RX ORDER — CITALOPRAM 40 MG/1
40 TABLET ORAL DAILY
Qty: 90 TABLET | Refills: 0 | Status: SHIPPED | OUTPATIENT
Start: 2024-04-18 | End: 2024-07-17

## 2024-04-18 NOTE — TELEPHONE ENCOUNTER
Nori Cabral called requesting a refill on the following medications:  Requested Prescriptions     Pending Prescriptions Disp Refills    citalopram (CELEXA) 40 MG tablet 90 tablet 0     Sig: Take 1 tablet by mouth daily       Date of last visit: 3/18/2024  Date of next visit (if applicable):6/24/2024  Date of last refill: 3/18/24  Pharmacy Name: [facility],  Cristy Ballesteros MA

## 2024-07-01 ENCOUNTER — OFFICE VISIT (OUTPATIENT)
Dept: FAMILY MEDICINE CLINIC | Age: 34
End: 2024-07-01
Payer: COMMERCIAL

## 2024-07-01 VITALS
WEIGHT: 293 LBS | BODY MASS INDEX: 51.91 KG/M2 | TEMPERATURE: 97 F | SYSTOLIC BLOOD PRESSURE: 122 MMHG | DIASTOLIC BLOOD PRESSURE: 74 MMHG | RESPIRATION RATE: 16 BRPM | HEART RATE: 78 BPM | HEIGHT: 63 IN | OXYGEN SATURATION: 97 %

## 2024-07-01 DIAGNOSIS — F41.9 ANXIETY: ICD-10-CM

## 2024-07-01 DIAGNOSIS — F33.1 MODERATE EPISODE OF RECURRENT MAJOR DEPRESSIVE DISORDER (HCC): ICD-10-CM

## 2024-07-01 PROCEDURE — 99214 OFFICE O/P EST MOD 30 MIN: CPT | Performed by: NURSE PRACTITIONER

## 2024-07-01 PROCEDURE — G8427 DOCREV CUR MEDS BY ELIG CLIN: HCPCS | Performed by: NURSE PRACTITIONER

## 2024-07-01 PROCEDURE — 1036F TOBACCO NON-USER: CPT | Performed by: NURSE PRACTITIONER

## 2024-07-01 PROCEDURE — G8417 CALC BMI ABV UP PARAM F/U: HCPCS | Performed by: NURSE PRACTITIONER

## 2024-07-01 RX ORDER — HYDROXYZINE HYDROCHLORIDE 25 MG/1
25 TABLET, FILM COATED ORAL 3 TIMES DAILY PRN
Qty: 60 TABLET | Refills: 3 | Status: SHIPPED | OUTPATIENT
Start: 2024-07-01

## 2024-07-01 RX ORDER — BUPROPION HYDROCHLORIDE 150 MG/1
150 TABLET, EXTENDED RELEASE ORAL 2 TIMES DAILY
Qty: 180 TABLET | Refills: 1 | Status: SHIPPED | OUTPATIENT
Start: 2024-07-01

## 2024-07-01 RX ORDER — CITALOPRAM 40 MG/1
40 TABLET ORAL DAILY
Qty: 90 TABLET | Refills: 1 | Status: SHIPPED | OUTPATIENT
Start: 2024-07-01 | End: 2024-12-28

## 2024-07-01 NOTE — PROGRESS NOTES
[facility] PROFESSIONAL [facility]  [address]  [facility] [address]  Dept: [phone]  Dept Fax: [phone]  Loc: [phone]     Visit Date:  7/1/2024    Patient:  Nori Cabral  YOB: 1990  Age: 34 y.o.  Gender: female  BMI: Body mass index is 52.61 kg/m².    Nori Cabral, Established patient, is being seen today for   Chief Complaint   Patient presents with    Anxiety     She is feeling great.     Depression     Doing good.     Medication Check     Welbutrin,   Celexa and atarax.    .     Assessment/Plan      1. Moderate episode of recurrent major depressive disorder (HCC)  -     citalopram (CELEXA) 40 MG tablet; Take 1 tablet by mouth daily, Disp-90 tablet, R-1Normal  -     buPROPion (WELLBUTRIN SR) 150 MG extended release tablet; Take 1 tablet by mouth 2 times daily, Disp-180 tablet, R-1Normal  2. Anxiety  -     citalopram (CELEXA) 40 MG tablet; Take 1 tablet by mouth daily, Disp-90 tablet, R-1Normal  -     hydrOXYzine HCl (ATARAX) 25 MG tablet; Take 1 tablet by mouth 3 times daily as needed for Anxiety, Disp-60 tablet, R-3Normal          Return in about 6 months (around 1/1/2025) for Physical and 6 month follow up.    HPI:     Patient states depression and anxiety have both been well controlled. States she is able to get things done and does a lot less worrying. States she feels like herself again and is enjoying life and her kids.  No other concerns a this time.        Medications    Current Outpatient Medications:     citalopram (CELEXA) 40 MG tablet, Take 1 tablet by mouth daily, Disp: 90 tablet, Rfl: 1    buPROPion (WELLBUTRIN SR) 150 MG extended release tablet, Take 1 tablet by mouth 2 times daily, Disp: 180 tablet, Rfl: 1    hydrOXYzine HCl (ATARAX) 25 MG tablet, Take 1 tablet by mouth 3 times daily as needed for Anxiety, Disp: 60 tablet, Rfl: 3    levonorgestrel (MIRENA, 52 MG,) IUD 52 mg, Mirena, Disp: , Rfl:

## 2024-07-03 ASSESSMENT — ENCOUNTER SYMPTOMS
EYE PAIN: 0
SORE THROAT: 0
ABDOMINAL PAIN: 0
NAUSEA: 0
CHEST TIGHTNESS: 0
VOMITING: 0
SHORTNESS OF BREATH: 0
COUGH: 0

## 2024-07-10 ENCOUNTER — HOSPITAL ENCOUNTER (EMERGENCY)
Age: 34
Discharge: HOME | End: 2024-07-10
Payer: COMMERCIAL

## 2024-07-10 VITALS
HEART RATE: 79 BPM | OXYGEN SATURATION: 97 % | HEIGHT: 63 IN | TEMPERATURE: 98 F | BODY MASS INDEX: 51.91 KG/M2 | RESPIRATION RATE: 16 BRPM | SYSTOLIC BLOOD PRESSURE: 129 MMHG | WEIGHT: 293 LBS | DIASTOLIC BLOOD PRESSURE: 79 MMHG

## 2024-07-10 DIAGNOSIS — J02.9 ACUTE PHARYNGITIS, UNSPECIFIED ETIOLOGY: Primary | ICD-10-CM

## 2024-07-10 LAB — S PYO AG THROAT QL: NEGATIVE

## 2024-07-10 PROCEDURE — 99213 OFFICE O/P EST LOW 20 MIN: CPT

## 2024-07-10 PROCEDURE — 87651 STREP A DNA AMP PROBE: CPT

## 2024-07-10 PROCEDURE — 99213 OFFICE O/P EST LOW 20 MIN: CPT | Performed by: NURSE PRACTITIONER

## 2024-07-10 RX ORDER — PREDNISONE 20 MG/1
40 TABLET ORAL DAILY
Qty: 10 TABLET | Refills: 0 | Status: SHIPPED | OUTPATIENT
Start: 2024-07-10 | End: 2024-07-15

## 2024-07-10 ASSESSMENT — ENCOUNTER SYMPTOMS
SHORTNESS OF BREATH: 0
VOMITING: 0
RHINORRHEA: 0
CHEST TIGHTNESS: 0
DIARRHEA: 0
SORE THROAT: 1
COUGH: 0
NAUSEA: 0

## 2024-07-10 ASSESSMENT — PAIN DESCRIPTION - DESCRIPTORS: DESCRIPTORS: ACHING

## 2024-07-10 ASSESSMENT — PAIN DESCRIPTION - LOCATION: LOCATION: THROAT

## 2024-07-10 ASSESSMENT — PAIN SCALES - GENERAL: PAINLEVEL_OUTOF10: 4

## 2024-07-10 ASSESSMENT — PAIN - FUNCTIONAL ASSESSMENT: PAIN_FUNCTIONAL_ASSESSMENT: 0-10

## 2024-07-10 NOTE — ED PROVIDER NOTES
[facility]  Urgent Care Encounter       CHIEF COMPLAINT       Chief Complaint   Patient presents with    Pharyngitis     Sore throat x 2 weeks    Nasal Congestion       Nurses Notes reviewed and I agree except as noted in the HPI.  HISTORY OF PRESENT ILLNESS   Nori Cabral is a 34 y.o. female who presents to the [facility] for evaluation of pharyngitis.  She reports symptoms started roughly 2 weeks ago.  She reports that she has been using over-the-counter Flonase, sinus medication, Tylenol, and Motrin without relief.  She reports that the pharyngitis is worse in the morning.  She reports that she is also congested in the morning.  She reports that the pharyngitis does improve throughout the day, but it is worse with eating food.  Denies fever or chills.  Denies exposure to someone ill.    The history is provided by the patient. No  was used.       REVIEW OF SYSTEMS     Review of Systems   Constitutional:  Negative for activity change, appetite change, chills, fatigue and fever.   HENT:  Positive for congestion and sore throat. Negative for ear discharge, ear pain and rhinorrhea.    Respiratory:  Negative for cough, chest tightness and shortness of breath.    Cardiovascular:  Negative for chest pain.   Gastrointestinal:  Negative for diarrhea, nausea and vomiting.   Genitourinary:  Negative for dysuria.   Skin:  Negative for rash.   Allergic/Immunologic: Negative for environmental allergies and food allergies.   Neurological:  Negative for dizziness and headaches.       PAST MEDICAL HISTORY         Diagnosis Date    Anemia     Anxiety and depression     False labor 06/28/2022    Obesity affecting pregnancy in second trimester 10/08/2018    Obesity affecting pregnancy in third trimester 12/03/2018    Postpartum depression     ROM (rupture of membranes), premature 06/28/2022       SURGICALHISTORY     Patient  has a past surgical history that includes  Cholecystectomy; [place] tooth extraction; and Tonsillectomy.    CURRENT MEDICATIONS       Previous Medications    BUPROPION (WELLBUTRIN SR) 150 MG EXTENDED RELEASE TABLET    Take 1 tablet by mouth 2 times daily    CITALOPRAM (CELEXA) 40 MG TABLET    Take 1 tablet by mouth daily    CLOTRIMAZOLE-BETAMETHASONE (LOTRISONE) 1-0.05 % CREAM    apply topically to affected area twice a day    FLUTICASONE (FLONASE) 50 MCG/ACT NASAL SPRAY    2 sprays by Each Nostril route daily    HYDROXYZINE HCL (ATARAX) 25 MG TABLET    Take 1 tablet by mouth 3 times daily as needed for Anxiety    LEVONORGESTREL (MIRENA, 52 MG,) IUD 52 MG    Mirena       ALLERGIES     Patient is is allergic to bactrim [sulfamethoxazole-trimethoprim].    Patients   Immunization History   Administered Date(s) Administered    MMR, PRIORIX, M-M-R II, (age 12m+), SC, 0.5mL 06/28/2022    TDaP, ADACEL (age 10y-64y), BOOSTRIX (age 10y+), IM, 0.5mL 06/28/2022       FAMILY HISTORY     Patient's family history includes Depression in her mother; High Blood Pressure in her father; Uterine Cancer in her maternal aunt, maternal grandmother, and mother.    SOCIAL HISTORY     Patient  reports that she has never smoked. She has never used smokeless tobacco. She reports that she does not currently use alcohol. She reports that she does not use drugs.    PHYSICAL EXAM     ED TRIAGE VITALS  BP: 129/79, Temp: 98 °F (36.7 °C), Pulse: 79, Respirations: 16, SpO2: 97 %,Estimated body mass index is 52.26 kg/m² as calculated from the following:    Height as of this encounter: 1.6 m (5' 3\").    Weight as of this encounter: 133.8 kg (295 lb).,No LMP recorded. (Menstrual status: IUD).    Physical Exam  Vitals and nursing note reviewed.   Constitutional:       General: She is not in acute distress.     Appearance: Normal appearance. She is not ill-appearing, toxic-appearing or diaphoretic.   HENT:      Head: Normocephalic.      Right Ear: Ear canal and external ear normal.      Left Ear:

## 2024-07-10 NOTE — ED TRIAGE NOTES
To room via ambulation with son. Pt c/o sore throat x 2 weeks along with nasal congestion (that clears in the am)

## 2024-10-04 DIAGNOSIS — F33.1 MODERATE EPISODE OF RECURRENT MAJOR DEPRESSIVE DISORDER (HCC): ICD-10-CM

## 2024-10-04 RX ORDER — BUPROPION HYDROCHLORIDE 150 MG/1
150 TABLET, EXTENDED RELEASE ORAL 2 TIMES DAILY
Qty: 180 TABLET | Refills: 1 | Status: SHIPPED | OUTPATIENT
Start: 2024-10-04

## 2024-10-04 NOTE — TELEPHONE ENCOUNTER
Nori Cabral called requesting a refill on the following medications:  Requested Prescriptions     Pending Prescriptions Disp Refills    buPROPion (WELLBUTRIN SR) 150 MG extended release tablet 180 tablet 1     Sig: Take 1 tablet by mouth 2 times daily       Date of last visit: 7/1/2024  Date of next visit (if applicable):10/7/2024  Date of last refill: 07/01/24  Pharmacy Name: [facility]      Thanks,  Camryn New LPN

## 2024-10-07 ENCOUNTER — OFFICE VISIT (OUTPATIENT)
Dept: FAMILY MEDICINE CLINIC | Age: 34
End: 2024-10-07
Payer: COMMERCIAL

## 2024-10-07 VITALS
TEMPERATURE: 98.4 F | RESPIRATION RATE: 18 BRPM | OXYGEN SATURATION: 98 % | HEART RATE: 84 BPM | SYSTOLIC BLOOD PRESSURE: 124 MMHG | DIASTOLIC BLOOD PRESSURE: 76 MMHG | WEIGHT: 283 LBS | BODY MASS INDEX: 50.14 KG/M2 | HEIGHT: 63 IN

## 2024-10-07 DIAGNOSIS — F41.9 ANXIETY: ICD-10-CM

## 2024-10-07 DIAGNOSIS — R35.0 URINARY FREQUENCY: ICD-10-CM

## 2024-10-07 DIAGNOSIS — F33.1 MODERATE EPISODE OF RECURRENT MAJOR DEPRESSIVE DISORDER (HCC): Primary | ICD-10-CM

## 2024-10-07 LAB
BILIRUBIN, POC: NEGATIVE
BLOOD URINE, POC: NORMAL
CLARITY, POC: CLEAR
COLOR, POC: YELLOW
GLUCOSE URINE, POC: NEGATIVE MG/DL
KETONES, POC: NEGATIVE MG/DL
LEUKOCYTE EST, POC: NORMAL
NITRITE, POC: NEGATIVE
PH, POC: 5.5
PROTEIN, POC: NORMAL MG/DL
SPECIFIC GRAVITY, POC: >=1.03
UROBILINOGEN, POC: 0.2 MG/DL

## 2024-10-07 PROCEDURE — G8484 FLU IMMUNIZE NO ADMIN: HCPCS | Performed by: NURSE PRACTITIONER

## 2024-10-07 PROCEDURE — G8427 DOCREV CUR MEDS BY ELIG CLIN: HCPCS | Performed by: NURSE PRACTITIONER

## 2024-10-07 PROCEDURE — 81003 URINALYSIS AUTO W/O SCOPE: CPT | Performed by: NURSE PRACTITIONER

## 2024-10-07 PROCEDURE — 99214 OFFICE O/P EST MOD 30 MIN: CPT | Performed by: NURSE PRACTITIONER

## 2024-10-07 PROCEDURE — 1036F TOBACCO NON-USER: CPT | Performed by: NURSE PRACTITIONER

## 2024-10-07 PROCEDURE — G8417 CALC BMI ABV UP PARAM F/U: HCPCS | Performed by: NURSE PRACTITIONER

## 2024-10-07 RX ORDER — CITALOPRAM HYDROBROMIDE 40 MG/1
40 TABLET ORAL DAILY
Qty: 90 TABLET | Refills: 1 | Status: SHIPPED | OUTPATIENT
Start: 2024-10-07 | End: 2025-04-05

## 2024-10-07 ASSESSMENT — ENCOUNTER SYMPTOMS
RESPIRATORY NEGATIVE: 1
NAUSEA: 1
EYES NEGATIVE: 1

## 2024-10-07 NOTE — PROGRESS NOTES
[facility]  [address]  [facility] [address]  Dept: [phone]  Loc: [phone]     Nori Cabral (:  1990) is a 34 y.o. female, here for evaluation of the following chief complaint(s):  Urinary Frequency (Present for a couple weeks. )      ASSESSMENT/PLAN:  1. Moderate episode of recurrent major depressive disorder (HCC)  Comments:  Stable. continue current medications  The following orders have not been finalized:  Orders:  -     citalopram (CELEXA) 40 MG tablet  2. Anxiety  Comments:  Stable. continue current medications  The following orders have not been finalized:  Orders:  -     citalopram (CELEXA) 40 MG tablet  3. Urinary frequency  Comments:  Sent out of culture. if negative referral to urology. Follow up with GYN due to mirena concerns.  Orders:  -     POCT Urinalysis No Micro (Auto)  -     Culture, Urine      Return for Regular follow up as scheduled and as needed.    SUBJECTIVE/OBJECTIVE:  Presents with urinary urgency, with pain/spasm after urinating for about one month. States symptoms are with every urination, seems to be worse in the morning. Admits to feeling nausea in the mornings. Expresses intentional weight loss about 60lbs over the past one year. Admits to pain in the left flank area that occurs intermittently in the mornings. Has been increasing fluid intake, taking otc azo(pyridium), and cranberry supplements trying to help. States has seen urology roughly 8 years ago due to pelvic floor issues and urinary urgency and was put on medication but since has not needed it due to having children.   States every month feels cramping and pressure \"like my uterus is going to fall out\". Has no had actual bleeding from menses in three months due to placement of IUD. Denies any discharge or odor. Expresses concerns IUD may be misplaced due to losing of weight over the past year.   Anxiety/Depression: stable. No

## 2024-10-08 ENCOUNTER — TELEPHONE (OUTPATIENT)
Dept: FAMILY MEDICINE CLINIC | Age: 34
End: 2024-10-08

## 2024-10-08 DIAGNOSIS — R35.0 URINARY FREQUENCY: Primary | ICD-10-CM

## 2024-10-08 DIAGNOSIS — R30.9 URINARY PAIN: ICD-10-CM

## 2024-10-08 LAB
BACTERIA UR CULT: ABNORMAL
ORGANISM: ABNORMAL

## 2024-10-08 NOTE — TELEPHONE ENCOUNTER
Spoke with and advised of results.     Patient would like to move forward with Urology referral. Has no specific preference other than staying within the area.

## 2024-10-08 NOTE — TELEPHONE ENCOUNTER
----- Message from EDIS Feliz CNP sent at 10/8/2024  9:09 AM EDT -----  No growth of bacteria on culture. Is she OK with referral to urology?

## 2024-11-19 ENCOUNTER — TELEPHONE (OUTPATIENT)
Dept: FAMILY MEDICINE CLINIC | Age: 34
End: 2024-11-19

## 2024-11-19 DIAGNOSIS — F33.1 MODERATE EPISODE OF RECURRENT MAJOR DEPRESSIVE DISORDER (HCC): ICD-10-CM

## 2024-11-19 DIAGNOSIS — F41.9 ANXIETY: ICD-10-CM

## 2024-11-19 RX ORDER — BUPROPION HYDROCHLORIDE 150 MG/1
150 TABLET, EXTENDED RELEASE ORAL 2 TIMES DAILY
Qty: 10 TABLET | Refills: 0 | Status: CANCELLED | OUTPATIENT
Start: 2024-11-19

## 2024-11-19 RX ORDER — BUPROPION HYDROCHLORIDE 150 MG/1
150 TABLET, EXTENDED RELEASE ORAL 2 TIMES DAILY
Qty: 10 TABLET | Refills: 0 | Status: SHIPPED | OUTPATIENT
Start: 2024-11-19

## 2024-11-19 RX ORDER — CITALOPRAM HYDROBROMIDE 40 MG/1
40 TABLET ORAL DAILY
Qty: 5 TABLET | Refills: 0 | Status: SHIPPED | OUTPATIENT
Start: 2024-11-19 | End: 2025-05-18

## 2024-11-19 RX ORDER — HYDROXYZINE HYDROCHLORIDE 25 MG/1
25 TABLET, FILM COATED ORAL 3 TIMES DAILY PRN
Qty: 15 TABLET | Refills: 0 | Status: SHIPPED | OUTPATIENT
Start: 2024-11-19
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
## 2024-11-19 NOTE — TELEPHONE ENCOUNTER
Spoke with pharmacy. Insurance won't  cover even with the vacation code. They will run through discount card and get ready for patient.     Patient is also informed.

## 2024-12-11 ENCOUNTER — APPOINTMENT (OUTPATIENT)
Dept: GENERAL RADIOLOGY | Age: 34
End: 2024-12-11
Payer: COMMERCIAL

## 2024-12-11 ENCOUNTER — HOSPITAL ENCOUNTER (EMERGENCY)
Age: 34
Discharge: HOME | End: 2024-12-11
Payer: COMMERCIAL

## 2024-12-11 VITALS
SYSTOLIC BLOOD PRESSURE: 140 MMHG | OXYGEN SATURATION: 98 % | HEART RATE: 88 BPM | RESPIRATION RATE: 20 BRPM | TEMPERATURE: 98 F | DIASTOLIC BLOOD PRESSURE: 80 MMHG | BODY MASS INDEX: 49.6 KG/M2 | WEIGHT: 280 LBS

## 2024-12-11 DIAGNOSIS — S52.502A CLOSED FRACTURE OF DISTAL END OF LEFT RADIUS, UNSPECIFIED FRACTURE MORPHOLOGY, INITIAL ENCOUNTER: Primary | ICD-10-CM

## 2024-12-11 PROCEDURE — 73090 X-RAY EXAM OF FOREARM: CPT

## 2024-12-11 PROCEDURE — 29105 APPLICATION LONG ARM SPLINT: CPT

## 2024-12-11 PROCEDURE — 99213 OFFICE O/P EST LOW 20 MIN: CPT

## 2024-12-11 PROCEDURE — 73110 X-RAY EXAM OF WRIST: CPT

## 2024-12-11 ASSESSMENT — PAIN DESCRIPTION - FREQUENCY: FREQUENCY: CONTINUOUS

## 2024-12-11 ASSESSMENT — PAIN DESCRIPTION - PAIN TYPE: TYPE: ACUTE PAIN

## 2024-12-11 ASSESSMENT — PAIN SCALES - GENERAL: PAINLEVEL_OUTOF10: 10

## 2024-12-11 ASSESSMENT — PAIN - FUNCTIONAL ASSESSMENT: PAIN_FUNCTIONAL_ASSESSMENT: 0-10

## 2024-12-11 NOTE — DISCHARGE INSTRUCTIONS
Tylenol every 4 hours  Motrin every 6 hours  Ice  Elevate on pillows  [facility] tomorrow AM - they open at 0730

## 2024-12-11 NOTE — ED PROVIDER NOTES
06/28/2022    TDaP, ADACEL (age 10y-64y), BOOSTRIX (age 10y+), IM, 0.5mL 06/28/2022       FAMILY HISTORY     Patient's family history includes Depression in her mother; High Blood Pressure in her father; Uterine Cancer in her maternal aunt, maternal grandmother, and mother.    SOCIAL HISTORY     Patient  reports that she has never smoked. She has never used smokeless tobacco. She reports that she does not currently use alcohol. She reports that she does not use drugs.    PHYSICAL EXAM     ED TRIAGE VITALS  BP: (!) 140/80, Temp: 98 °F (36.7 °C), Pulse: 88, Respirations: 20, SpO2: 98 %,Estimated body mass index is 49.6 kg/m² as calculated from the following:    Height as of 10/7/24: 1.6 m (5' 3\").    Weight as of this encounter: 127 kg (280 lb).,No LMP recorded. (Menstrual status: IUD).    Physical Exam  Vitals and nursing note reviewed.   Constitutional:       Appearance: She is obese.   Cardiovascular:      Rate and Rhythm: Normal rate and regular rhythm.      Pulses: Normal pulses.      Heart sounds: Normal heart sounds.   Pulmonary:      Effort: Pulmonary effort is normal.      Breath sounds: Normal breath sounds.   Musculoskeletal:         General: Swelling, tenderness and signs of injury present.      Left forearm: Tenderness present.      Left wrist: Tenderness present. Decreased range of motion.        Hands:    Skin:     General: Skin is warm and dry.   Neurological:      Mental Status: She is alert.         DIAGNOSTIC RESULTS     Labs:No results found for this visit on 12/11/24.    IMAGING:    XR RADIUS ULNA LEFT (2 VIEWS)   Final Result      Distal radius fracture.            **This report has been created using voice recognition software. It may contain   minor errors which are inherent in voice recognition technology.**         Electronically signed by Dr. [place]ima Hurtado      XR WRIST LEFT (MIN 3 VIEWS)   Final Result      Distal radius fracture.            **This report has been created using voice  recognition software. It may contain   minor errors which are inherent in voice recognition technology.**         Electronically signed by Dr. [place]ima Hurtado            EKG:      URGENT CARE COURSE:     Vitals:    12/11/24 1553   BP: (!) 140/80   Pulse: 88   Resp: 20   Temp: 98 °F (36.7 °C)   TempSrc: Temporal   SpO2: 98%   Weight: 127 kg (280 lb)       Medications - No data to display         PROCEDURES:  None    FINAL IMPRESSION      1. Closed fracture of distal end of left radius, unspecified fracture morphology, initial encounter          DISPOSITION/ PLAN   Patient placed in splint and sling. Educated to take tylenol every 4 hours, motrin every 6 hours, ice, elevate. Follow up with [facility] in the morning.         PATIENT REFERRED TO:  Mckayla Hurtado APRN - CNP  [address] [address]      DISCHARGE MEDICATIONS:  New Prescriptions    No medications on file       Discontinued Medications    No medications on file       Current Discharge Medication List          EDIS Shepherd CNP    (Please note that portions of this note were completed with a voice recognition program. Efforts were made to edit the dictations but occasionally words are mis-transcribed.)            Ladonna Rader APRN - CNP  12/11/24 [id number]       Ladonna Rader APRN - CNP  12/11/24 164

## 2024-12-11 NOTE — ED TRIAGE NOTES
Patient to room with c/o left wrist pain beginning one hour prior to arrival after falling down approximately 4 stairs in her home. Ice applied to left wrist.

## 2025-01-06 ASSESSMENT — PATIENT HEALTH QUESTIONNAIRE - PHQ9
SUM OF ALL RESPONSES TO PHQ QUESTIONS 1-9: 1
7. TROUBLE CONCENTRATING ON THINGS, SUCH AS READING THE NEWSPAPER OR WATCHING TELEVISION: NOT AT ALL
5. POOR APPETITE OR OVEREATING: NOT AT ALL
4. FEELING TIRED OR HAVING LITTLE ENERGY: NOT AT ALL
2. FEELING DOWN, DEPRESSED OR HOPELESS: NOT AT ALL
9. THOUGHTS THAT YOU WOULD BE BETTER OFF DEAD, OR OF HURTING YOURSELF: NOT AT ALL
3. TROUBLE FALLING OR STAYING ASLEEP: NOT AT ALL
6. FEELING BAD ABOUT YOURSELF - OR THAT YOU ARE A FAILURE OR HAVE LET YOURSELF OR YOUR FAMILY DOWN: NOT AT ALL
10. IF YOU CHECKED OFF ANY PROBLEMS, HOW DIFFICULT HAVE THESE PROBLEMS MADE IT FOR YOU TO DO YOUR WORK, TAKE CARE OF THINGS AT HOME, OR GET ALONG WITH OTHER PEOPLE: SOMEWHAT DIFFICULT
8. MOVING OR SPEAKING SO SLOWLY THAT OTHER PEOPLE COULD HAVE NOTICED. OR THE OPPOSITE, BEING SO FIGETY OR RESTLESS THAT YOU HAVE BEEN MOVING AROUND A LOT MORE THAN USUAL: NOT AT ALL
5. POOR APPETITE OR OVEREATING: NOT AT ALL
SUM OF ALL RESPONSES TO PHQ QUESTIONS 1-9: 1
SUM OF ALL RESPONSES TO PHQ QUESTIONS 1-9: 1
3. TROUBLE FALLING OR STAYING ASLEEP: NOT AT ALL
SUM OF ALL RESPONSES TO PHQ9 QUESTIONS 1 & 2: 1
10. IF YOU CHECKED OFF ANY PROBLEMS, HOW DIFFICULT HAVE THESE PROBLEMS MADE IT FOR YOU TO DO YOUR WORK, TAKE CARE OF THINGS AT HOME, OR GET ALONG WITH OTHER PEOPLE: SOMEWHAT DIFFICULT
6. FEELING BAD ABOUT YOURSELF - OR THAT YOU ARE A FAILURE OR HAVE LET YOURSELF OR YOUR FAMILY DOWN: NOT AT ALL
2. FEELING DOWN, DEPRESSED OR HOPELESS: NOT AT ALL
9. THOUGHTS THAT YOU WOULD BE BETTER OFF DEAD, OR OF HURTING YOURSELF: NOT AT ALL
SUM OF ALL RESPONSES TO PHQ QUESTIONS 1-9: 1
1. LITTLE INTEREST OR PLEASURE IN DOING THINGS: SEVERAL DAYS
SUM OF ALL RESPONSES TO PHQ QUESTIONS 1-9: 1
7. TROUBLE CONCENTRATING ON THINGS, SUCH AS READING THE NEWSPAPER OR WATCHING TELEVISION: NOT AT ALL
8. MOVING OR SPEAKING SO SLOWLY THAT OTHER PEOPLE COULD HAVE NOTICED. OR THE OPPOSITE - BEING SO FIDGETY OR RESTLESS THAT YOU HAVE BEEN MOVING AROUND A LOT MORE THAN USUAL: NOT AT ALL
1. LITTLE INTEREST OR PLEASURE IN DOING THINGS: SEVERAL DAYS
4. FEELING TIRED OR HAVING LITTLE ENERGY: NOT AT ALL

## 2025-01-09 ENCOUNTER — OFFICE VISIT (OUTPATIENT)
Dept: FAMILY MEDICINE CLINIC | Age: 35
End: 2025-01-09

## 2025-01-09 ENCOUNTER — OFFICE VISIT (OUTPATIENT)
Dept: UROLOGY | Age: 35
End: 2025-01-09

## 2025-01-09 VITALS
BODY MASS INDEX: 51.03 KG/M2 | TEMPERATURE: 98.7 F | DIASTOLIC BLOOD PRESSURE: 80 MMHG | RESPIRATION RATE: 16 BRPM | OXYGEN SATURATION: 97 % | WEIGHT: 288 LBS | HEIGHT: 63 IN | SYSTOLIC BLOOD PRESSURE: 128 MMHG | HEART RATE: 80 BPM

## 2025-01-09 VITALS — WEIGHT: 280 LBS | HEIGHT: 63 IN | BODY MASS INDEX: 49.61 KG/M2 | RESPIRATION RATE: 20 BRPM

## 2025-01-09 DIAGNOSIS — Z23 NEED FOR IMMUNIZATION AGAINST INFLUENZA: ICD-10-CM

## 2025-01-09 DIAGNOSIS — Z00.00 ENCOUNTER FOR WELL ADULT EXAM WITHOUT ABNORMAL FINDINGS: ICD-10-CM

## 2025-01-09 DIAGNOSIS — F33.1 MODERATE EPISODE OF RECURRENT MAJOR DEPRESSIVE DISORDER (HCC): ICD-10-CM

## 2025-01-09 DIAGNOSIS — R35.0 URINARY FREQUENCY: ICD-10-CM

## 2025-01-09 DIAGNOSIS — N39.3 STRESS INCONTINENCE, FEMALE: Primary | ICD-10-CM

## 2025-01-09 DIAGNOSIS — R39.15 URINARY URGENCY: ICD-10-CM

## 2025-01-09 DIAGNOSIS — Z00.01 ENCOUNTER FOR WELL ADULT EXAM WITH ABNORMAL FINDINGS: Primary | ICD-10-CM

## 2025-01-09 DIAGNOSIS — E66.813 CLASS 3 SEVERE OBESITY DUE TO EXCESS CALORIES WITHOUT SERIOUS COMORBIDITY WITH BODY MASS INDEX (BMI) OF 50.0 TO 59.9 IN ADULT: ICD-10-CM

## 2025-01-09 DIAGNOSIS — E66.01 CLASS 3 SEVERE OBESITY DUE TO EXCESS CALORIES WITHOUT SERIOUS COMORBIDITY WITH BODY MASS INDEX (BMI) OF 50.0 TO 59.9 IN ADULT: ICD-10-CM

## 2025-01-09 DIAGNOSIS — F41.9 ANXIETY: ICD-10-CM

## 2025-01-09 DIAGNOSIS — Z11.4 SCREENING FOR HIV WITHOUT PRESENCE OF RISK FACTORS: ICD-10-CM

## 2025-01-09 LAB
BILIRUBIN, POC: NORMAL
BLOOD URINE, POC: NORMAL
CLARITY, POC: CLEAR
COLOR, POC: YELLOW
GLUCOSE URINE, POC: NORMAL MG/DL
KETONES, POC: NORMAL MG/DL
LEUKOCYTE EST, POC: NORMAL
NITRITE, POC: NORMAL
PH, POC: 6
POST VOID RESIDUAL (PVR): 130 ML
PROTEIN, POC: NORMAL MG/DL
SPECIFIC GRAVITY, POC: 1.03
UROBILINOGEN, POC: NORMAL MG/DL

## 2025-01-09 RX ORDER — BUPROPION HYDROCHLORIDE 300 MG/1
300 TABLET ORAL EVERY MORNING
Qty: 90 TABLET | Refills: 3 | Status: SHIPPED | OUTPATIENT
Start: 2025-01-09

## 2025-01-09 RX ORDER — OXYBUTYNIN CHLORIDE 10 MG/1
10 TABLET, EXTENDED RELEASE ORAL DAILY
Qty: 30 TABLET | Refills: 2 | Status: SHIPPED | OUTPATIENT
Start: 2025-01-09 | End: 2025-02-08

## 2025-01-09 SDOH — ECONOMIC STABILITY: FOOD INSECURITY: WITHIN THE PAST 12 MONTHS, THE FOOD YOU BOUGHT JUST DIDN'T LAST AND YOU DIDN'T HAVE MONEY TO GET MORE.: NEVER TRUE

## 2025-01-09 SDOH — ECONOMIC STABILITY: FOOD INSECURITY: WITHIN THE PAST 12 MONTHS, YOU WORRIED THAT YOUR FOOD WOULD RUN OUT BEFORE YOU GOT MONEY TO BUY MORE.: NEVER TRUE

## 2025-01-09 ASSESSMENT — ENCOUNTER SYMPTOMS
SORE THROAT: 0
NAUSEA: 0
EYE PAIN: 0
ABDOMINAL PAIN: 0
SHORTNESS OF BREATH: 0
COUGH: 0
CHEST TIGHTNESS: 0
VOMITING: 0

## 2025-01-09 NOTE — PROGRESS NOTES
SpO2 97%   BMI 51.02 kg/m²     Wt Readings from Last 3 Encounters:   01/09/25 130.6 kg (288 lb)   01/09/25 127 kg (280 lb)   12/11/24 127 kg (280 lb)       Physical Exam  Constitutional:       General: She is not in acute distress.     Appearance: Normal appearance. She is obese. She is not ill-appearing.   HENT:      Head: Normocephalic.      Nose: Nose normal.   Eyes:      Conjunctiva/sclera: Conjunctivae normal.   Neck:      Thyroid: No thyroid mass, thyromegaly or thyroid tenderness.      Vascular: No carotid bruit.   Cardiovascular:      Rate and Rhythm: Normal rate and regular rhythm.      Heart sounds: Normal heart sounds.   Pulmonary:      Effort: Pulmonary effort is normal.      Breath sounds: Normal breath sounds.   Musculoskeletal:         General: Normal range of motion.      Cervical back: Neck supple.   Skin:     General: Skin is warm and dry.      Findings: No rash.   Neurological:      General: No focal deficit present.      Mental Status: She is alert and oriented to person, place, and time.   Psychiatric:         Mood and Affect: Mood normal.         Behavior: Behavior normal.         Thought Content: Thought content normal.         Judgment: Judgment normal.             Personalized Preventive Plan  Current Health Maintenance Status  Immunization History   Administered Date(s) Administered    COVID-19, [place] Vaccine, Vaccine Unspecified 03/24/2021, 04/22/2021    Influenza, FLUARIX, FLULAVAL, FLUZONE (age 6 mo+) and AFLURIA, (age 3 y+), Quadv PF, 0.5mL 10/16/2018    MMR, PRIORIX, M-M-R II, (age 12m+), SC, 0.5mL 06/28/2022    TDaP, ADACEL (age 10y-64y), BOOSTRIX (age 10y+), IM, 0.5mL 12/05/2018, 06/28/2022        Health Maintenance Due   Topic Date Due    Varicella vaccine (1 of 2 - 13+ 2-dose series) Never done    HIV screen  Never done    Hepatitis C screen  Never done    Hepatitis B vaccine (1 of 3 - 19+ 3-dose series) Never done    Flu vaccine (1) 08/01/2024    COVID-19 Vaccine (3 - 2023-24

## 2025-01-09 NOTE — PROGRESS NOTES
BUN/Creatinine:  Lab Results   Component Value Date/Time    BUN 8 01/13/2024 09:44 AM    CREATININE 0.5 01/13/2024 09:44 AM       Radiology  No updated urologic imaging for review.    Assessment/Plan:   1. Stress incontinence, female  - Offered PFPT, patient would like to continue home exercises.   - Discussed if symptoms do no improve consider cystoscopy/pelvic exam.   - Discussed potential third line therapies.     2. Urinary frequency  3. Urinary urgency  - Patient instructed to avoid bladder irritants in diet such as coffee, tea, caffeine, alcohol, carbonated beverages, spicy/acidic foods.   - Start Ditropan, Pt counseled on potential side effects of urinary retention, constipation, dry mouth, and confusion.     - oxyBUTYnin (DITROPAN XL) 10 MG extended release tablet; Take 1 tablet by mouth daily  Dispense: 30 tablet; Refill: 2       -Patient has no other questions, comments, or concerns.   -They agree with and understand the plan of care.   -The patient was encouraged to call the office or seek emergency care should this change.      EDIS Arevalo - CNP

## 2025-01-09 NOTE — PATIENT INSTRUCTIONS
if you think you may have a problem with alcohol or drug use. This includes prescription medicines, marijuana, and other drugs.     Avoid tobacco and nicotine: Don't smoke, vape, or chew. If you need help quitting, talk to your doctor.   Practice safer sex. Getting tested, using condoms or dental dams, and limiting sex partners can help prevent STIs.     Use birth control if it's important to you to prevent pregnancy. Talk with your doctor about your choices and what might be best for you.   Prevent problems where you can. Protect your skin from too much sun, wash your hands, brush your teeth twice a day, and wear a seat belt in the car.   Where can you learn more?  Go to https://www.[employer].net/patientEd and enter P072 to learn more about \"Well Visit, Ages 18 to 65: Care Instructions.\"  Current as of: April 30, 2024  Content Version: 14.3  © 2024 [employer].   Care instructions adapted under license by [employer]. If you have questions about a medical condition or this instruction, always ask your healthcare professional. [employer], [employer], disclaims any warranty or liability for your use of this information.

## 2025-01-09 NOTE — PATIENT INSTRUCTIONS
Patient instructed to avoid bladder irritants in diet such as coffee, tea, caffeine, alcohol, carbonated beverages, spicy/acidic foods.     Pt counseled on potential side effects of urinary retention, constipation, dry mouth, and confusion.      Call sooner with any questions or concerns.

## 2025-02-13 ENCOUNTER — OFFICE VISIT (OUTPATIENT)
Dept: UROLOGY | Age: 35
End: 2025-02-13
Payer: COMMERCIAL

## 2025-02-13 VITALS — BODY MASS INDEX: 51.03 KG/M2 | RESPIRATION RATE: 20 BRPM | WEIGHT: 288 LBS | HEIGHT: 63 IN

## 2025-02-13 DIAGNOSIS — K59.00 CONSTIPATION, UNSPECIFIED CONSTIPATION TYPE: ICD-10-CM

## 2025-02-13 DIAGNOSIS — R39.15 URINARY URGENCY: ICD-10-CM

## 2025-02-13 DIAGNOSIS — N39.0 RECURRENT UTI: ICD-10-CM

## 2025-02-13 DIAGNOSIS — N39.3 STRESS INCONTINENCE, FEMALE: Primary | ICD-10-CM

## 2025-02-13 DIAGNOSIS — R35.0 URINARY FREQUENCY: ICD-10-CM

## 2025-02-13 LAB
BILIRUBIN, POC: NORMAL
BLOOD URINE, POC: NORMAL
CLARITY, POC: NORMAL
COLOR, POC: YELLOW
GLUCOSE URINE, POC: NORMAL MG/DL
KETONES, POC: NORMAL MG/DL
LEUKOCYTE EST, POC: NORMAL
NITRITE, POC: POSITIVE
PH, POC: 6
PROTEIN, POC: NORMAL MG/DL
SPECIFIC GRAVITY, POC: 1.03
UROBILINOGEN, POC: 0.2 MG/DL

## 2025-02-13 PROCEDURE — 99214 OFFICE O/P EST MOD 30 MIN: CPT | Performed by: NURSE PRACTITIONER

## 2025-02-13 PROCEDURE — 1036F TOBACCO NON-USER: CPT | Performed by: NURSE PRACTITIONER

## 2025-02-13 PROCEDURE — 81002 URINALYSIS NONAUTO W/O SCOPE: CPT | Performed by: NURSE PRACTITIONER

## 2025-02-13 PROCEDURE — G8417 CALC BMI ABV UP PARAM F/U: HCPCS | Performed by: NURSE PRACTITIONER

## 2025-02-13 PROCEDURE — G8427 DOCREV CUR MEDS BY ELIG CLIN: HCPCS | Performed by: NURSE PRACTITIONER

## 2025-02-13 RX ORDER — NITROFURANTOIN 25; 75 MG/1; MG/1
100 CAPSULE ORAL 2 TIMES DAILY
Qty: 20 CAPSULE | Refills: 0 | Status: SHIPPED | OUTPATIENT
Start: 2025-02-13 | End: 2025-02-23

## 2025-02-13 RX ORDER — POLYETHYLENE GLYCOL 3350 17 G/17G
17 POWDER, FOR SOLUTION ORAL DAILY
Qty: 1530 G | Refills: 1 | Status: SHIPPED | OUTPATIENT
Start: 2025-02-13 | End: 2025-08-12

## 2025-02-13 NOTE — PATIENT INSTRUCTIONS
Recommend D-mannose and cranberry pill two times.     Recommend good bowel regimen.    Call sooner with any questions or concerns.

## 2025-02-13 NOTE — PROGRESS NOTES
[facility] [place] [facility]  [facility]  [address] [facility] [address]  Dept: [phone]  Loc: [phone]    Visit Date: 2/13/2025        HPI:     Nori Cabral is a 34 y.o. female who presents today for:  Chief Complaint   Patient presents with    Incontinence     Follow up medication is helping some          HPI  Patient presents to urology clinic for follow-up.     Nori reports stress urinary incontinence is main concern, this has improved with pelvic floor exercises and Ditropan. She also notes urinary urgency and frequency.   She does have history of constipation.  Denies flank pain, suprapubic pressure, gross hematuria, dysuria, fever or chills. No recent UTI.      History of 4 vaginal births (1 set of twins)     Previously seen urologists in [place] for recurrent uti. Was on bladder medication 10 years ago, unknown which one.      Hx of uterine cancer in the family. Follows with Dr. Horton for monitoring,.        Current Outpatient Medications   Medication Sig Dispense Refill    nitrofurantoin, macrocrystal-monohydrate, (MACROBID) 100 MG capsule Take 1 capsule by mouth 2 times daily for 10 days 20 capsule 0    polyethylene glycol (GLYCOLAX) 17 GM/SCOOP powder Take 17 g by mouth daily 1530 g 1    oxyBUTYnin (DITROPAN XL) 10 MG extended release tablet Take 1 tablet by mouth daily 30 tablet 2    buPROPion (WELLBUTRIN XL) 300 MG extended release tablet Take 1 tablet by mouth every morning 90 tablet 3    citalopram (CELEXA) 40 MG tablet Take 1 tablet by mouth daily 5 tablet 0    hydrOXYzine HCl (ATARAX) 25 MG tablet Take 1 tablet by mouth 3 times daily as needed for Anxiety 15 tablet 0    levonorgestrel (MIRENA, 52 MG,) IUD 52 mg Mirena      fluticasone (FLONASE) 50 MCG/ACT nasal spray 2 sprays by Each Nostril route daily 16 g 0     No current facility-administered medications for this visit.       Past Medical History  Nori  has a past medical history of Anemia,

## 2025-02-16 LAB
BACTERIA UR CULT: ABNORMAL
ORGANISM: ABNORMAL

## 2025-02-21 ENCOUNTER — OFFICE VISIT (OUTPATIENT)
Dept: FAMILY MEDICINE CLINIC | Age: 35
End: 2025-02-21
Payer: COMMERCIAL

## 2025-02-21 VITALS
WEIGHT: 293 LBS | SYSTOLIC BLOOD PRESSURE: 134 MMHG | RESPIRATION RATE: 18 BRPM | HEART RATE: 92 BPM | BODY MASS INDEX: 52.08 KG/M2 | DIASTOLIC BLOOD PRESSURE: 86 MMHG | OXYGEN SATURATION: 98 %

## 2025-02-21 DIAGNOSIS — F41.9 ANXIETY: ICD-10-CM

## 2025-02-21 DIAGNOSIS — F33.1 MODERATE EPISODE OF RECURRENT MAJOR DEPRESSIVE DISORDER (HCC): Primary | ICD-10-CM

## 2025-02-21 DIAGNOSIS — G43.909 MIGRAINE WITHOUT STATUS MIGRAINOSUS, NOT INTRACTABLE, UNSPECIFIED MIGRAINE TYPE: ICD-10-CM

## 2025-02-21 DIAGNOSIS — N30.00 ACUTE CYSTITIS WITHOUT HEMATURIA: ICD-10-CM

## 2025-02-21 PROCEDURE — 99214 OFFICE O/P EST MOD 30 MIN: CPT | Performed by: NURSE PRACTITIONER

## 2025-02-21 PROCEDURE — G8427 DOCREV CUR MEDS BY ELIG CLIN: HCPCS | Performed by: NURSE PRACTITIONER

## 2025-02-21 PROCEDURE — G8417 CALC BMI ABV UP PARAM F/U: HCPCS | Performed by: NURSE PRACTITIONER

## 2025-02-21 PROCEDURE — 1036F TOBACCO NON-USER: CPT | Performed by: NURSE PRACTITIONER

## 2025-02-21 RX ORDER — BUPROPION HYDROCHLORIDE 450 MG/1
450 TABLET, FILM COATED, EXTENDED RELEASE ORAL EVERY MORNING
Qty: 90 TABLET | Refills: 1 | Status: SHIPPED | OUTPATIENT
Start: 2025-02-21 | End: 2025-02-24 | Stop reason: SDUPTHER

## 2025-02-21 NOTE — PROGRESS NOTES
[facility] PROFESSIONAL [facility]  [address]  [facility] [address]  Dept: [phone]  Dept Fax: [phone]  Loc: [phone]     Visit Date:  2/21/2025    Provider: EDIS Thorne CNP        Patient:  Nori Cabral  YOB: 1990    HPI:     Chief Complaint   Patient presents with    Follow-up     4 week follow up       History of Present Illness  The patient is a 34-year-old female who presents today for follow-up on depression and an increase in her Wellbutrin dose.    She reports a positive response to the increased dosage of Wellbutrin, which she believes has been beneficial in managing her depressive symptoms. She notes that typically, February is a challenging month for her due to the end of winter, but this year she has found it more manageable. She expresses uncertainty about the potential impact of her clothing choices on her motivation levels. She describes a recent episode of obsessive cleaning of her laundry room until the task was completed. Her anxiety levels have been well-controlled, with no significant episodes reported. She recounts a recent nightmare that induced a panic attack, a sensation she had not experienced in a long time. Her current regimen includes a morning dose of 300 mg of Wellbutrin, an increase from her previous twice-daily dose of 150 mg.    She has been diagnosed with a urinary tract infection (UTI) and has been prescribed Macrobid. However, she has not yet initiated the treatment due to concerns about potential side effects, including the risk of a yeast infection. She also questions whether the medication could interfere with her intrauterine device (IUD).    She has been experiencing migraines since she was 2 years old. She had a migraine yesterday and took her medication, but it did not help. She usually takes 800 mg of ibuprofen and gets in the shower as hot as she can stand it. Vomiting usually

## 2025-02-24 DIAGNOSIS — F33.1 MODERATE EPISODE OF RECURRENT MAJOR DEPRESSIVE DISORDER (HCC): ICD-10-CM

## 2025-02-24 RX ORDER — BUPROPION HYDROCHLORIDE 450 MG/1
450 TABLET, FILM COATED, EXTENDED RELEASE ORAL EVERY MORNING
Qty: 30 TABLET | Refills: 3 | Status: SHIPPED | OUTPATIENT
Start: 2025-02-24

## 2025-02-24 NOTE — TELEPHONE ENCOUNTER
Patient wanted her Bupropion 450 MG TB24 changed from a 90 day supply to a 30 day supply. That is all her insurance will cover. Was sent through [employer]

## 2025-03-05 ENCOUNTER — TELEPHONE (OUTPATIENT)
Dept: FAMILY MEDICINE CLINIC | Age: 35
End: 2025-03-05

## 2025-03-05 NOTE — TELEPHONE ENCOUNTER
Patient called stating her insurance did not cover wellbutrin 450mg  Office notes faxed to [facility] per letter received 2/24/25

## 2025-03-13 DIAGNOSIS — R35.0 URINARY FREQUENCY: ICD-10-CM

## 2025-03-13 DIAGNOSIS — R39.15 URINARY URGENCY: ICD-10-CM

## 2025-03-13 DIAGNOSIS — N39.3 STRESS INCONTINENCE, FEMALE: ICD-10-CM

## 2025-03-14 RX ORDER — OXYBUTYNIN CHLORIDE 10 MG/1
10 TABLET, EXTENDED RELEASE ORAL DAILY
Qty: 30 TABLET | Refills: 1 | Status: SHIPPED | OUTPATIENT
Start: 2025-03-14 | End: 2025-04-13

## 2025-03-14 NOTE — TELEPHONE ENCOUNTER
Nori Cabral called requesting a refill on the following medications:  Requested Prescriptions     Pending Prescriptions Disp Refills    oxyBUTYnin (DITROPAN-XL) 10 MG extended release tablet [Pharmacy Med Name: Oxybutynin Chloride 10mg Extended-Release Tablet] 30 tablet 1     Sig: Take 1 tablet by mouth daily.     Pharmacy verified:  [email or web address]      Date of last visit: 02/13/2025  Date of next visit (if applicable): 5/8/2025

## 2025-04-03 DIAGNOSIS — F41.9 ANXIETY: ICD-10-CM

## 2025-04-03 DIAGNOSIS — F33.1 MODERATE EPISODE OF RECURRENT MAJOR DEPRESSIVE DISORDER (HCC): ICD-10-CM

## 2025-04-03 RX ORDER — CITALOPRAM HYDROBROMIDE 40 MG/1
40 TABLET ORAL DAILY
Qty: 90 TABLET | Refills: 3 | Status: SHIPPED | OUTPATIENT
Start: 2025-04-03 | End: 2026-04-03

## 2025-04-03 RX ORDER — BUPROPION HYDROCHLORIDE 300 MG/1
300 TABLET ORAL EVERY MORNING
Qty: 30 TABLET | Refills: 3 | Status: SHIPPED | OUTPATIENT
Start: 2025-04-03

## 2025-04-03 RX ORDER — BUPROPION HYDROCHLORIDE 150 MG/1
150 TABLET ORAL EVERY MORNING
Qty: 30 TABLET | Refills: 0 | Status: SHIPPED | OUTPATIENT
Start: 2025-04-03

## 2025-04-03 NOTE — TELEPHONE ENCOUNTER
Patient called requesting a refill of her medications to go to [facility] pharmacy. All pended. Please sign. Nori Cabral called requesting a refill on the following medications:  Requested Prescriptions     Pending Prescriptions Disp Refills    buPROPion (WELLBUTRIN XL) 150 MG extended release tablet 30 tablet 0     Sig: Take 1 tablet by mouth every morning with wellbutrin 300mg tablet to equal 450mg dose    buPROPion (WELLBUTRIN XL) 300 MG extended release tablet 30 tablet 3     Sig: Take 1 tablet by mouth every morning with wellbutrin 150mg tablet to equal 450mg dose    citalopram (CELEXA) 40 MG tablet 90 tablet 3     Sig: Take 1 tablet by mouth daily       Date of last visit: 2/21/2025  Date of next visit (if applicable):8/21/2025  Date of last refill: Different Pharmacy   Pharmacy Name: Caterina Singh MA

## 2025-04-13 DIAGNOSIS — F33.1 MODERATE EPISODE OF RECURRENT MAJOR DEPRESSIVE DISORDER (HCC): ICD-10-CM

## 2025-04-14 RX ORDER — BUPROPION HYDROCHLORIDE 150 MG/1
TABLET ORAL
Qty: 30 TABLET | Refills: 2 | Status: SHIPPED | OUTPATIENT
Start: 2025-04-14

## 2025-04-14 NOTE — TELEPHONE ENCOUNTER
Nori Cabral called requesting a refill on the following medications:  Requested Prescriptions     Pending Prescriptions Disp Refills    buPROPion (WELLBUTRIN XL) 150 MG extended release tablet [Pharmacy Med Name: Bupropion Hydrochloride 150mg Extended-Release (XL) Tablet] 30 tablet 0     Sig: Take 1 tablet by mouth every morning with wellbutrin 300 mg tablet to equal 450 mg dose.       Date of last visit: 2/21/2025  Date of next visit (if applicable):8/21/2025  Date of last refill: 4/3/25  Pharmacy Name: [facility]      Thanks,  Linette Garcia MA

## 2025-05-08 DIAGNOSIS — N39.3 STRESS INCONTINENCE, FEMALE: ICD-10-CM

## 2025-05-08 DIAGNOSIS — R39.15 URINARY URGENCY: ICD-10-CM

## 2025-05-08 DIAGNOSIS — R35.0 URINARY FREQUENCY: ICD-10-CM

## 2025-05-08 RX ORDER — OXYBUTYNIN CHLORIDE 10 MG/1
10 TABLET, EXTENDED RELEASE ORAL DAILY
Qty: 30 TABLET | Refills: 1 | Status: SHIPPED | OUTPATIENT
Start: 2025-05-08 | End: 2025-06-07

## 2025-05-08 NOTE — TELEPHONE ENCOUNTER
Nori Cabral called requesting a refill on the following medications:  Requested Prescriptions     Pending Prescriptions Disp Refills    oxyBUTYnin (DITROPAN-XL) 10 MG extended release tablet 30 tablet 1     Sig: Take 1 tablet by mouth daily     Pharmacy verified:  [email or web address]      Date of last visit: 01/09/2025  Date of next visit (if applicable): 06/12/2025

## 2025-06-11 NOTE — PROGRESS NOTES
Past Medical History  Nori  has a past medical history of Anemia, Anxiety and depression, False labor, Obesity affecting pregnancy in second trimester, Obesity affecting pregnancy in third trimester, Postpartum depression, and ROM (rupture of membranes), premature.    Past Surgical History  The patient  has a past surgical history that includes Cholecystectomy; [place] tooth extraction; Tonsillectomy; and Wrist fracture surgery (Left, 12/2024).    Family History  This patient's family history includes Depression in her mother; High Blood Pressure in her father; Uterine Cancer in her maternal aunt, maternal grandmother, and mother.    Social History  Nori  reports that she has never smoked. She has never used smokeless tobacco. She reports that she does not currently use alcohol. She reports that she does not use drugs.      Subjective:      REVIEW OF SYSTEMS:  Constitutional: negative  Eyes: negative  Respiratory: negative  Cardiovascular: negative  Gastrointestinal: negative  Musculoskeletal: negative  Genitourinary: negative except for what is in HPI  Skin: negative   Neurological: negative  Hematological/Lymphatic: negative  Psychological: negative    Objective:   Pulse 78   Resp (!) 97   Ht 1.6 m (5' 2.99\")   Wt 133.4 kg (294 lb)   BMI 52.09 kg/m²     Patient is a 35 y.o. female in no acute distress and alert and oriented to person, place and time.    Pulmonary: Non-labored respiration.  Cardiovascular: Normal rate and regular rhythm  Skin: Warm and dry.  Psych: Normal mood and affect.  Genitourinary: Bladder non-distended and non-tender.    POC  Results for orders placed or performed in visit on 06/12/25   poct post void residual   Result Value Ref Range    post void residual 182 ml         Patients recent PSA values are as follows  No results found for: \"PSA\"     Recent BUN/Creatinine:  Lab Results   Component Value Date/Time    BUN 8 01/13/2024 09:44 AM    CREATININE 0.5 01/13/2024 09:44 AM

## 2025-06-12 ENCOUNTER — OFFICE VISIT (OUTPATIENT)
Dept: UROLOGY | Age: 35
End: 2025-06-12
Payer: COMMERCIAL

## 2025-06-12 VITALS — HEIGHT: 63 IN | BODY MASS INDEX: 51.91 KG/M2 | HEART RATE: 78 BPM | RESPIRATION RATE: 97 BRPM | WEIGHT: 293 LBS

## 2025-06-12 DIAGNOSIS — R35.0 URINARY FREQUENCY: ICD-10-CM

## 2025-06-12 DIAGNOSIS — R39.15 URINARY URGENCY: ICD-10-CM

## 2025-06-12 DIAGNOSIS — N39.3 STRESS INCONTINENCE, FEMALE: Primary | ICD-10-CM

## 2025-06-12 DIAGNOSIS — N39.0 RECURRENT UTI: ICD-10-CM

## 2025-06-12 LAB — POST VOID RESIDUAL (PVR): 182 ML

## 2025-06-12 PROCEDURE — 99213 OFFICE O/P EST LOW 20 MIN: CPT | Performed by: NURSE PRACTITIONER

## 2025-06-12 PROCEDURE — 51798 US URINE CAPACITY MEASURE: CPT | Performed by: NURSE PRACTITIONER

## 2025-06-12 PROCEDURE — G8427 DOCREV CUR MEDS BY ELIG CLIN: HCPCS | Performed by: NURSE PRACTITIONER

## 2025-06-12 PROCEDURE — 1036F TOBACCO NON-USER: CPT | Performed by: NURSE PRACTITIONER

## 2025-06-12 PROCEDURE — G8417 CALC BMI ABV UP PARAM F/U: HCPCS | Performed by: NURSE PRACTITIONER

## 2025-06-12 RX ORDER — OXYBUTYNIN CHLORIDE 10 MG/1
10 TABLET, EXTENDED RELEASE ORAL DAILY
Qty: 30 TABLET | Refills: 11 | Status: SHIPPED | OUTPATIENT
Start: 2025-06-12 | End: 2025-07-12

## 2025-07-04 DIAGNOSIS — F33.1 MODERATE EPISODE OF RECURRENT MAJOR DEPRESSIVE DISORDER (HCC): ICD-10-CM

## 2025-07-05 DIAGNOSIS — F33.1 MODERATE EPISODE OF RECURRENT MAJOR DEPRESSIVE DISORDER (HCC): ICD-10-CM

## 2025-07-07 RX ORDER — BUPROPION HYDROCHLORIDE 300 MG/1
TABLET ORAL
Qty: 30 TABLET | Refills: 2 | Status: SHIPPED | OUTPATIENT
Start: 2025-07-07

## 2025-07-07 RX ORDER — BUPROPION HYDROCHLORIDE 150 MG/1
TABLET ORAL
Qty: 30 TABLET | Refills: 1 | OUTPATIENT
Start: 2025-07-07

## 2025-07-07 NOTE — TELEPHONE ENCOUNTER
Nori Cabral called requesting a refill on the following medications:  Requested Prescriptions     Pending Prescriptions Disp Refills    buPROPion (WELLBUTRIN XL) 300 MG extended release tablet [Pharmacy Med Name: Bupropion Hydrochloride 300mg Extended-Release (XL) Tablet] 30 tablet 2     Sig: Take 1 tablet by mouth every morning with Wellbutrin 150mg to equal 450mg total daily.       Date of last visit: 2/21/2025  Date of next visit (if applicable):8/21/25  Date of last refill: 4/3/25  Pharmacy Name: Laine Singh LPN

## 2025-08-13 DIAGNOSIS — F33.1 MODERATE EPISODE OF RECURRENT MAJOR DEPRESSIVE DISORDER (HCC): ICD-10-CM

## 2025-08-13 RX ORDER — BUPROPION HYDROCHLORIDE 150 MG/1
150 TABLET ORAL EVERY MORNING
Qty: 30 TABLET | Refills: 2 | Status: SHIPPED | OUTPATIENT
Start: 2025-08-13